# Patient Record
Sex: FEMALE | Race: WHITE | NOT HISPANIC OR LATINO | Employment: OTHER | URBAN - METROPOLITAN AREA
[De-identification: names, ages, dates, MRNs, and addresses within clinical notes are randomized per-mention and may not be internally consistent; named-entity substitution may affect disease eponyms.]

---

## 2019-01-02 ENCOUNTER — OFFICE VISIT (OUTPATIENT)
Dept: GASTROENTEROLOGY | Facility: CLINIC | Age: 64
End: 2019-01-02

## 2019-01-02 VITALS
TEMPERATURE: 98.4 F | BODY MASS INDEX: 30.4 KG/M2 | HEIGHT: 62 IN | WEIGHT: 165.2 LBS | DIASTOLIC BLOOD PRESSURE: 76 MMHG | SYSTOLIC BLOOD PRESSURE: 130 MMHG

## 2019-01-02 DIAGNOSIS — R19.8 ABNORMAL BOWEL HABITS: ICD-10-CM

## 2019-01-02 DIAGNOSIS — R15.1 FECAL SMEARING: Primary | ICD-10-CM

## 2019-01-02 PROCEDURE — 99203 OFFICE O/P NEW LOW 30 MIN: CPT | Performed by: INTERNAL MEDICINE

## 2019-01-02 RX ORDER — ESTRADIOL 2 MG/1
2 TABLET ORAL DAILY
COMMUNITY

## 2019-01-02 RX ORDER — LORAZEPAM 1 MG/1
TABLET ORAL
COMMUNITY
End: 2022-11-18

## 2019-01-02 RX ORDER — VALACYCLOVIR HYDROCHLORIDE 1 G/1
1000 TABLET, FILM COATED ORAL DAILY PRN
COMMUNITY

## 2019-01-02 RX ORDER — OMEPRAZOLE 20 MG/1
CAPSULE, DELAYED RELEASE ORAL
COMMUNITY
End: 2020-07-10 | Stop reason: SDUPTHER

## 2019-01-02 RX ORDER — ACYCLOVIR 50 MG/G
1 CREAM TOPICAL AS NEEDED
COMMUNITY

## 2019-01-02 RX ORDER — RIZATRIPTAN BENZOATE 10 MG/1
TABLET ORAL
COMMUNITY
End: 2022-11-18

## 2019-01-02 RX ORDER — OXYCODONE HCL 40 MG/1
40 TABLET, FILM COATED, EXTENDED RELEASE ORAL EVERY 12 HOURS
COMMUNITY

## 2019-01-02 RX ORDER — NYSTATIN 100000 U/G
CREAM TOPICAL
COMMUNITY
End: 2022-11-18

## 2019-01-02 RX ORDER — ONDANSETRON 8 MG/1
8 TABLET, ORALLY DISINTEGRATING ORAL EVERY 8 HOURS PRN
Refills: 1 | COMMUNITY
Start: 2018-12-22

## 2019-01-02 RX ORDER — ESOMEPRAZOLE MAGNESIUM 40 MG/1
CAPSULE, DELAYED RELEASE ORAL
COMMUNITY
End: 2020-07-10

## 2019-01-02 RX ORDER — MONTELUKAST SODIUM 10 MG/1
10 TABLET ORAL NIGHTLY
COMMUNITY

## 2019-01-02 RX ORDER — LIDOCAINE AND PRILOCAINE 25; 25 MG/G; MG/G
CREAM TOPICAL
Refills: 5 | COMMUNITY
Start: 2018-12-15 | End: 2023-02-20

## 2019-01-02 RX ORDER — TIZANIDINE 4 MG/1
TABLET ORAL
Refills: 0 | COMMUNITY
Start: 2018-12-10 | End: 2022-11-18

## 2019-01-02 RX ORDER — LEVOTHYROXINE SODIUM 88 UG/1
88 TABLET ORAL DAILY
COMMUNITY

## 2019-01-02 RX ORDER — OXYCODONE HYDROCHLORIDE 15 MG/1
15 TABLET ORAL EVERY 4 HOURS PRN
COMMUNITY

## 2019-01-02 NOTE — PROGRESS NOTES
Chief Complaint   Patient presents with   • Fecal Incontinence   • Heartburn     Fanny Perera is a 63 y.o. female who presents with fecal incontinence and heartburn.    This has been going on for years but getting worse.  She has a BM daily.  She has trouble getting clean.  She cannot sense a BM - she has incontinence w/o warning.  No blood in stool.  She will have days with multiple BMs - can't get clean. She has tried fiber without improvement     Heartburn is well controlled with current meds.  EGD  with mild inflammation - no Ramos's.      She has had 13 back surgeries and is on chronic pain medication.  She has not had any change in her dose.  She's been on pain medication for 13 years.    Her last colonoscopy was many years ago.  She cannot recall that there being any abnormalities.  She has no family history of colorectal cancer or polyps.    HPI  Past Medical History:   Diagnosis Date   • Cancer (CMS/HCC)    • Chronic back pain    • GERD (gastroesophageal reflux disease)    • Herpes simplex    • Migraine    • Thyroid disease      Past Surgical History:   Procedure Laterality Date   • BREAST AUGMENTATION     • BREAST LUMPECTOMY     •  SECTION     • CHOLECYSTECTOMY     • COLONOSCOPY  approx     normal per patient   • HYSTERECTOMY     • LUMBAR SPINE SURGERY      x 13   • TONSILLECTOMY     • WISDOM TOOTH EXTRACTION         Current Outpatient Medications:   •  acyclovir (ZOVIRAX) 5 % cream, Zovirax 5 % topical cream, Disp: , Rfl:   •  diclofenac (VOLTAREN) 1 % gel gel, Voltaren 1 % topical gel, Disp: , Rfl:   •  estradiol (ESTRACE) 2 MG tablet, estradiol 2 mg tablet, Disp: , Rfl:   •  levothyroxine (SYNTHROID, LEVOTHROID) 88 MCG tablet, levothyroxine 88 mcg tablet, Disp: , Rfl:   •  lidocaine-prilocaine (EMLA) 2.5-2.5 % cream, APPLY 1-2 grams 3 TO 4 TIMES DAILY AS NEEDED, Disp: , Rfl: 5  •  LORazepam (ATIVAN) 1 MG tablet, lorazepam 1 mg tablet, Disp: , Rfl:   •  LYRICA 50 MG capsule, Take 50  mg by mouth 2 (Two) Times a Day., Disp: , Rfl: 0  •  montelukast (SINGULAIR) 10 MG tablet, montelukast 10 mg tablet, Disp: , Rfl:   •  nystatin (MYCOSTATIN) 835042 UNIT/GM cream, nystatin 100,000 unit/gram topical cream, Disp: , Rfl:   •  omeprazole (priLOSEC) 20 MG capsule, omeprazole 20 mg capsule,delayed release, Disp: , Rfl:   •  ondansetron ODT (ZOFRAN-ODT) 8 MG disintegrating tablet, TAKE 1 TABLET EVERY 8 HOURS AS NEEDED FOR NAUSEA AND VOMITING, Disp: , Rfl: 1  •  oxyCODONE (ROXICODONE) 15 MG immediate release tablet, oxycodone 15 mg tablet, Disp: , Rfl:   •  oxyCODONE ER (OXYCONTIN) 40 MG 12 hr tablet, OxyContin 40 mg tablet,crush resistant,extended release, Disp: , Rfl:   •  rizatriptan (MAXALT) 10 MG tablet, rizatriptan 10 mg tablet, Disp: , Rfl:   •  tiZANidine (ZANAFLEX) 4 MG tablet, Take 1/2 tablet by mouth in the morning and (1 & 1/2) tablets in the evening, as needed, Disp: , Rfl: 0  •  triamcinolone (KENALOG) 0.1 % ointment, APPLY TO EXTERNAL EAR CANAL TWICE DAILY AS NEEDED, Disp: , Rfl: 2  •  umeclidinium-vilanterol (ANORO ELLIPTA) 62.5-25 MCG/INH aerosol powder  inhaler, Anoro Ellipta 62.5 mcg-25 mcg/actuation powder for inhalation, Disp: , Rfl:   •  valACYclovir (VALTREX) 1000 MG tablet, valacyclovir 1 gram tablet, Disp: , Rfl:   •  esomeprazole (NEXIUM) 40 MG capsule, esomeprazole magnesium 40 mg capsule,delayed release, Disp: , Rfl:   Allergies no known allergies  Social History     Socioeconomic History   • Marital status:      Spouse name: Not on file   • Number of children: Not on file   • Years of education: Not on file   • Highest education level: Not on file   Social Needs   • Financial resource strain: Not on file   • Food insecurity - worry: Not on file   • Food insecurity - inability: Not on file   • Transportation needs - medical: Not on file   • Transportation needs - non-medical: Not on file   Occupational History   • Not on file   Tobacco Use   • Smoking status: Former Smoker      Packs/day: 0.50     Types: Cigarettes     Start date:      Last attempt to quit:      Years since quittin.0   • Smokeless tobacco: Never Used   Substance and Sexual Activity   • Alcohol use: Yes     Alcohol/week: 0.6 oz     Types: 1 Glasses of wine per week     Comment: kseldom   • Drug use: No   • Sexual activity: Not on file   Other Topics Concern   • Not on file   Social History Narrative   • Not on file     History reviewed. No pertinent family history.  Review of Systems   Constitutional: Negative for appetite change and unexpected weight change.   Gastrointestinal: Positive for abdominal pain and diarrhea. Negative for blood in stool.   Musculoskeletal: Positive for back pain.   All other systems reviewed and are negative.    Vitals:    19 1328   BP: 130/76   Temp: 98.4 °F (36.9 °C)         19  1328   Weight: 74.9 kg (165 lb 3.2 oz)     Physical Exam   Constitutional: She appears well-developed and well-nourished.   HENT:   Head: Normocephalic and atraumatic.   Eyes: No scleral icterus.   Pulmonary/Chest: Effort normal.   Abdominal: Soft. She exhibits no distension and no mass. There is no tenderness.   Neurological: She is alert.   Skin: Skin is warm and dry.   Psychiatric: She has a normal mood and affect.     No images are attached to the encounter.  No notes on file  Fanny was seen today for fecal incontinence and heartburn.    Diagnoses and all orders for this visit:    Fecal smearing  -     Case Request; Standing  -     Case Request    Abnormal bowel habits  -     Case Request; Standing  -     Case Request    Other orders  -     Follow Anesthesia Guidelines / Standing Orders; Future  -     Implement Anesthesia orders day of procedure.; Standing  -     Obtain informed consent; Standing  -     Verify bowel prep was successful; Standing  -     Give tap water enema if bowel prep was insufficient; Standing    PLan:  - c/s for further eval  - further recs based upon results of  colonoscopic and anorectal exam

## 2019-01-22 ENCOUNTER — ANESTHESIA EVENT (OUTPATIENT)
Dept: GASTROENTEROLOGY | Facility: HOSPITAL | Age: 64
End: 2019-01-22

## 2019-01-22 ENCOUNTER — HOSPITAL ENCOUNTER (OUTPATIENT)
Facility: HOSPITAL | Age: 64
Setting detail: HOSPITAL OUTPATIENT SURGERY
Discharge: HOME | End: 2019-01-22
Attending: INTERNAL MEDICINE | Admitting: INTERNAL MEDICINE

## 2019-01-22 ENCOUNTER — ANESTHESIA (OUTPATIENT)
Dept: GASTROENTEROLOGY | Facility: HOSPITAL | Age: 64
End: 2019-01-22

## 2019-01-22 VITALS
BODY MASS INDEX: 29.44 KG/M2 | HEIGHT: 62 IN | DIASTOLIC BLOOD PRESSURE: 78 MMHG | TEMPERATURE: 97.9 F | RESPIRATION RATE: 16 BRPM | SYSTOLIC BLOOD PRESSURE: 129 MMHG | OXYGEN SATURATION: 100 % | HEART RATE: 61 BPM | WEIGHT: 160 LBS

## 2019-01-22 DIAGNOSIS — R15.1 FECAL SMEARING: ICD-10-CM

## 2019-01-22 DIAGNOSIS — R19.8 ABNORMAL BOWEL HABITS: ICD-10-CM

## 2019-01-22 PROCEDURE — S0260 H&P FOR SURGERY: HCPCS | Performed by: INTERNAL MEDICINE

## 2019-01-22 PROCEDURE — 88305 TISSUE EXAM BY PATHOLOGIST: CPT | Performed by: INTERNAL MEDICINE

## 2019-01-22 PROCEDURE — 45380 COLONOSCOPY AND BIOPSY: CPT | Performed by: INTERNAL MEDICINE

## 2019-01-22 PROCEDURE — 25010000002 PROPOFOL 10 MG/ML EMULSION: Performed by: ANESTHESIOLOGY

## 2019-01-22 RX ORDER — LIDOCAINE HYDROCHLORIDE 20 MG/ML
INJECTION, SOLUTION INFILTRATION; PERINEURAL AS NEEDED
Status: DISCONTINUED | OUTPATIENT
Start: 2019-01-22 | End: 2019-01-22 | Stop reason: SURG

## 2019-01-22 RX ORDER — SODIUM CHLORIDE, SODIUM LACTATE, POTASSIUM CHLORIDE, CALCIUM CHLORIDE 600; 310; 30; 20 MG/100ML; MG/100ML; MG/100ML; MG/100ML
1000 INJECTION, SOLUTION INTRAVENOUS CONTINUOUS
Status: DISCONTINUED | OUTPATIENT
Start: 2019-01-22 | End: 2019-01-22 | Stop reason: HOSPADM

## 2019-01-22 RX ORDER — PROPOFOL 10 MG/ML
VIAL (ML) INTRAVENOUS CONTINUOUS PRN
Status: DISCONTINUED | OUTPATIENT
Start: 2019-01-22 | End: 2019-01-22 | Stop reason: SURG

## 2019-01-22 RX ORDER — PROPOFOL 10 MG/ML
VIAL (ML) INTRAVENOUS AS NEEDED
Status: DISCONTINUED | OUTPATIENT
Start: 2019-01-22 | End: 2019-01-22 | Stop reason: SURG

## 2019-01-22 RX ADMIN — LIDOCAINE HYDROCHLORIDE 60 MG: 20 INJECTION, SOLUTION INFILTRATION; PERINEURAL at 08:32

## 2019-01-22 RX ADMIN — PROPOFOL 100 MCG/KG/MIN: 10 INJECTION, EMULSION INTRAVENOUS at 08:30

## 2019-01-22 RX ADMIN — PROPOFOL 100 MG: 10 INJECTION, EMULSION INTRAVENOUS at 08:30

## 2019-01-22 RX ADMIN — SODIUM CHLORIDE, POTASSIUM CHLORIDE, SODIUM LACTATE AND CALCIUM CHLORIDE: 600; 310; 30; 20 INJECTION, SOLUTION INTRAVENOUS at 08:30

## 2019-01-22 NOTE — ANESTHESIA PREPROCEDURE EVALUATION
Anesthesia Evaluation     Patient summary reviewed and Nursing notes reviewed                Airway   Mallampati: I  TM distance: >3 FB  Neck ROM: full  No difficulty expected  Dental - normal exam     Pulmonary - negative pulmonary ROS and normal exam   Cardiovascular - negative cardio ROS and normal exam        Neuro/Psych  (+) headaches,     GI/Hepatic/Renal/Endo - negative ROS     Musculoskeletal (-) negative ROS    Abdominal  - normal exam    Bowel sounds: normal.   Substance History - negative use     OB/GYN negative ob/gyn ROS         Other      history of cancer                    Anesthesia Plan    ASA 2     MAC     Anesthetic plan, all risks, benefits, and alternatives have been provided, discussed and informed consent has been obtained with: patient.

## 2019-01-22 NOTE — ANESTHESIA POSTPROCEDURE EVALUATION
"Patient: Fanny Perera    Procedure Summary     Date:  01/22/19 Room / Location:   [facility]    Anesthesia Start:  0831 Anesthesia Stop:  0901    Procedure:  COLONOSCOPY (N/A ) Diagnosis:       Fecal smearing      Abnormal bowel habits      (Fecal smearing [R15.1])      (Abnormal bowel habits [R19.8])    Surgeon:  Socorro Ramsay MD Provider:  Celso Lr MD    Anesthesia Type:  MAC ASA Status:  2          Anesthesia Type: MAC  Last vitals  BP   129/78 (01/22/19 0918)   Temp   36.6 °C (97.9 °F) (01/22/19 0908)   Pulse   61 (01/22/19 0918)   Resp   16 (01/22/19 0918)     SpO2   100 % (01/22/19 0918)     Post Anesthesia Care and Evaluation    Patient location during evaluation: PACU  Patient participation: complete - patient participated  Level of consciousness: awake  Pain score: 0  Pain management: adequate  Airway patency: patent  Anesthetic complications: No anesthetic complications  PONV Status: none  Cardiovascular status: acceptable  Respiratory status: acceptable  Hydration status: acceptable    Comments: /78 (BP Location: Left arm, Patient Position: Lying)   Pulse 61   Temp 36.6 °C (97.9 °F) (Oral)   Resp 16   Ht 157.5 cm (62\")   Wt 72.6 kg (160 lb)   SpO2 100%   BMI 29.26 kg/m²       "

## 2019-01-22 NOTE — H&P
[facility]  Pre Procedure History & Physical    Chief Complaint: fecal incontinence      Subjective     HPI:   Fanny Perera is a 63 y.o. female who presents with fecal incontinence and heartburn.     This has been going on for years but getting worse.  She has a BM daily.  She has trouble getting clean.  She cannot sense a BM - she has incontinence w/o warning.  No blood in stool.  She will have days with multiple BMs - can't get clean. She has tried fiber without improvement      Heartburn is well controlled with current meds.  EGD  with mild inflammation - no Ramos's.       She has had 13 back surgeries and is on chronic pain medication.  She has not had any change in her dose.  She's been on pain medication for 13 years.     Her last colonoscopy was many years ago.  She cannot recall that there being any abnormalities.  She has no family history of colorectal cancer or polyps        Past Medical History:   Past Medical History:   Diagnosis Date   • Cancer (CMS/HCC)    • Chronic back pain    • GERD (gastroesophageal reflux disease)    • Herpes simplex    • Migraine    • Thyroid disease        Past Surgical History:  Past Surgical History:   Procedure Laterality Date   • BACK SURGERY     • BREAST AUGMENTATION     • BREAST LUMPECTOMY     •  SECTION     • CHOLECYSTECTOMY     • COLONOSCOPY  approx     normal per patient   • HYSTERECTOMY     • LUMBAR SPINE SURGERY      x 13   • TONSILLECTOMY     • WISDOM TOOTH EXTRACTION         Family History:  History reviewed. No pertinent family history.    Social History:   reports that she quit smoking about 23 years ago. Her smoking use included cigarettes. She started smoking about 47 years ago. She smoked 0.50 packs per day. she has never used smokeless tobacco. She reports that she drinks about 0.6 oz of alcohol per week. She reports that she does not use drugs.    Medications:   Medications Prior to Admission   Medication Sig  Dispense Refill Last Dose   • acyclovir (ZOVIRAX) 5 % cream Zovirax 5 % topical cream   Past Week at Unknown time   • diclofenac (VOLTAREN) 1 % gel gel Voltaren 1 % topical gel   Past Week at Unknown time   • esomeprazole (NEXIUM) 40 MG capsule esomeprazole magnesium 40 mg capsule,delayed release   1/21/2019 at Unknown time   • estradiol (ESTRACE) 2 MG tablet estradiol 2 mg tablet   1/21/2019 at Unknown time   • levothyroxine (SYNTHROID, LEVOTHROID) 88 MCG tablet levothyroxine 88 mcg tablet   1/21/2019 at Unknown time   • lidocaine-prilocaine (EMLA) 2.5-2.5 % cream APPLY 1-2 grams 3 TO 4 TIMES DAILY AS NEEDED  5 Past Week at Unknown time   • LYRICA 50 MG capsule Take 50 mg by mouth 2 (Two) Times a Day.  0 Past Week at Unknown time   • montelukast (SINGULAIR) 10 MG tablet montelukast 10 mg tablet   Past Month at Unknown time   • omeprazole (priLOSEC) 20 MG capsule omeprazole 20 mg capsule,delayed release   1/21/2019 at Unknown time   • ondansetron ODT (ZOFRAN-ODT) 8 MG disintegrating tablet TAKE 1 TABLET EVERY 8 HOURS AS NEEDED FOR NAUSEA AND VOMITING  1 Past Week at Unknown time   • oxyCODONE (ROXICODONE) 15 MG immediate release tablet oxycodone 15 mg tablet   1/22/2019 at Unknown time   • oxyCODONE ER (OXYCONTIN) 40 MG 12 hr tablet OxyContin 40 mg tablet,crush resistant,extended release   1/21/2019 at Unknown time   • rizatriptan (MAXALT) 10 MG tablet rizatriptan 10 mg tablet   Past Week at Unknown time   • tiZANidine (ZANAFLEX) 4 MG tablet Take 1/2 tablet by mouth in the morning and (1 & 1/2) tablets in the evening, as needed  0 Past Month at Unknown time   • triamcinolone (KENALOG) 0.1 % ointment APPLY TO EXTERNAL EAR CANAL TWICE DAILY AS NEEDED  2 Past Month at Unknown time   • LORazepam (ATIVAN) 1 MG tablet lorazepam 1 mg tablet   Taking   • nystatin (MYCOSTATIN) 566258 UNIT/GM cream nystatin 100,000 unit/gram topical cream   More than a month at Unknown time   • umeclidinium-vilanterol (ANORO ELLIPTA) 62.5-25  "MCG/INH aerosol powder  inhaler Anoro Ellipta 62.5 mcg-25 mcg/actuation powder for inhalation   Taking   • valACYclovir (VALTREX) 1000 MG tablet valacyclovir 1 gram tablet   More than a month at Unknown time       Allergies:  Patient has no known allergies.    ROS:    Pertinent items are noted in HPI, all other systems reviewed and negative     Objective     Blood pressure 110/65, pulse 65, temperature 97.7 °F (36.5 °C), temperature source Oral, resp. rate 16, height 157.5 cm (62\"), weight 72.6 kg (160 lb), SpO2 96 %.    Physical Exam   Constitutional: Pt is oriented to person, place, and time and well-developed, well-nourished, and in no distress.   Mouth/Throat: Oropharynx is clear and moist.   Neck: Normal range of motion.   Cardiovascular: Normal rate, regular rhythm     Pulmonary/Chest: Effort normal   Abdominal: Soft. Nontender  Skin: Skin is warm and dry.   Psychiatric: Mood, memory, affect and judgment normal.     Assessment/Plan     Diagnosis:  Fecal incontinence, change in bowel habits    Anticipated Surgical Procedure:  colonoscopy    The risks, benefits, and alternatives of this procedure have been discussed with the patient or the responsible party- the patient understands and agrees to proceed.                                                            "

## 2019-01-23 ENCOUNTER — TELEPHONE (OUTPATIENT)
Dept: GASTROENTEROLOGY | Facility: CLINIC | Age: 64
End: 2019-01-23

## 2019-01-23 LAB
CYTO UR: NORMAL
LAB AP CASE REPORT: NORMAL
PATH REPORT.FINAL DX SPEC: NORMAL
PATH REPORT.GROSS SPEC: NORMAL

## 2019-01-23 RX ORDER — MONTELUKAST SODIUM 4 MG/1
TABLET, CHEWABLE ORAL
Qty: 60 TABLET | Refills: 11 | Status: SHIPPED | OUTPATIENT
Start: 2019-01-23 | End: 2019-02-19 | Stop reason: SDUPTHER

## 2019-01-23 NOTE — TELEPHONE ENCOUNTER
Please let her know that her biopsies did not show any inflammation    She does have decreased sphincter tone which is likely contributing to her issues.    Would like to try a trial of Colestid to see if this will help her have a more succinct bowel movement that is easier to control.  I have sent this to her pharmacy.  Take 1 tablet daily.  She needs to take it at a different time from her thyroid medicine.  Please schedule 6-8 week follow-up but call if not improving

## 2019-01-23 NOTE — TELEPHONE ENCOUNTER
Pt called and advised per Dr Ramsay that her bx did not show any inflammation.  She does have decrease sphincter tone which is likely contributing to her issues.      She would like her to trial colestid to see if this will help her have a more succinct bowel movement that is easier to control.  She has sent this to her pharmacy.  She is to take one tab daily.  She needs to take it a different time from her thyroid med.  Adivsed her to f/u in 6-8wks.  Pt verb understanding and will call back to make apt.

## 2019-02-11 ENCOUNTER — TELEPHONE (OUTPATIENT)
Dept: GASTROENTEROLOGY | Facility: CLINIC | Age: 64
End: 2019-02-11

## 2019-02-11 NOTE — TELEPHONE ENCOUNTER
----- Message from Yanique Delgadillo sent at 2/11/2019 10:54 AM EST -----  Regarding: pt called   Contact: [phone]  Pt called, stated medication colestipol (COLESTID) 1 g tablet is not working for her

## 2019-02-14 NOTE — TELEPHONE ENCOUNTER
Would conside trial movantik to see if this will help with bowel changes related to her pain meds - may allow her to have more natural BMs.  Would start alone w/o colestid.  If she would like to try will send rx

## 2019-02-14 NOTE — TELEPHONE ENCOUNTER
Call to pt.  Advise per Dr Ramsay that would consider trial movantik to see if this will  Help with bowel changes related to pain meds.  May allow to have more natural BM's.  Would start alone without colestid.      Pt verb understanding and states would like to try.  Message to Dr Ramsay.

## 2019-02-18 ENCOUNTER — TELEPHONE (OUTPATIENT)
Dept: GASTROENTEROLOGY | Facility: CLINIC | Age: 64
End: 2019-02-18

## 2019-02-18 NOTE — TELEPHONE ENCOUNTER
Called pt and pt reports that she started the movantik on Friday 02/15 .  Pt states it runs her to the bathroom She states she is having 3 very loose stools with urgency per day.  Pt states she read where this is a laxative. Pt states she has not been constipated but has been having issues with leakage.  Advised Dr Ramsay is out of the office today but will send message to Dr Ramsay.  Pt verb understanding.

## 2019-02-18 NOTE — TELEPHONE ENCOUNTER
----- Message from Sherron Molina sent at 2/18/2019  1:46 PM EST -----  Regarding: Naloxegol Oxalate  Contact: [phone]  PT STATED MADE HER PROBLEM WORSE..

## 2019-02-19 ENCOUNTER — TELEPHONE (OUTPATIENT)
Dept: GASTROENTEROLOGY | Facility: CLINIC | Age: 64
End: 2019-02-19

## 2019-02-19 RX ORDER — MONTELUKAST SODIUM 4 MG/1
2 TABLET, CHEWABLE ORAL DAILY
Qty: 60 TABLET | Refills: 11 | Status: SHIPPED | OUTPATIENT
Start: 2019-02-19 | End: 2020-03-04

## 2019-02-19 NOTE — TELEPHONE ENCOUNTER
Spoke with patient.  Advised her to stop the Movantik.  We discussed her response to Colestid.  She just felt like it did not help and she thinks she only took one a day.  I advised her to resume the Colestid at 2 g daily.  I reminded her not to take it near her Synthroid.  She will call me in 2 weeks with an update.  I refilled her prescription.

## 2019-02-19 NOTE — TELEPHONE ENCOUNTER
----- Message from Gail Hutson RN sent at 2/19/2019  4:15 PM EST -----  Regarding: Pt called again- still having issues   Contact: [phone]  Pt called back. She is still having issues with loose stools and urgency. Please call to discuss.

## 2019-02-19 NOTE — TELEPHONE ENCOUNTER
Call to pt.  Very frustrated.  States continues with stool leaking - worse since on Movantik (see note of 2/11).      Advise pt will send f/u message to DR Ramsay from message of 2/18.  Pt verb understanding.

## 2019-03-06 ENCOUNTER — TELEPHONE (OUTPATIENT)
Dept: GASTROENTEROLOGY | Facility: CLINIC | Age: 64
End: 2019-03-06

## 2019-03-06 NOTE — TELEPHONE ENCOUNTER
Call to pt.  States has resume colestid as instructed with call of 2/19.  No improvement.  Unless goes to BR immediately after eating, experiences stool incontinence.  Has no sensation that will be passing stool.  Denies blood, abd cramping.  At wits end on how to manage this.    Update to DR Ramsay.

## 2019-03-06 NOTE — TELEPHONE ENCOUNTER
----- Message from Amanda Loredo sent at 3/6/2019 11:24 AM EST -----  Regarding: MED NOT WORKING   Contact: [phone]  BOWEL INCONTINENCE. PLEASE CALL. PT DIDN'T HAVE MED WITH HER. PT SAID IT'S THE 1ST MED DR JONES STARTED HER ON.

## 2019-03-13 NOTE — TELEPHONE ENCOUNTER
Call to pt.  States stool consistency same as was before started colestid.  Describes as formed, but passes stool without prior urge.    Update to Dr Ramsay.

## 2019-03-20 NOTE — TELEPHONE ENCOUNTER
Given her lack of response to the agents we have tried so far, I would consider referral to a colorectal surgeon for consideration of InterStim placement (which is a treatment for fecal incontinence).  She will need anorectal manometry to test the strength of those muscles which can be performed by the colorectal surgeon to see if she is a candidate for this type of treatment.  Unfortunately she does not seem to be responding to medical management.  Please let me know if she is interested and I can place a consult.

## 2019-03-25 NOTE — TELEPHONE ENCOUNTER
Call to pt.  Advise per Dr Ramsay that given lack of response to the agents we have tried so far, would consider referral to a colorectal surgeon for consideration of InterStim placement (which is a tx for fecal incontinence).  Will need anorectal manometry to test the strength of those muscles which can be performed by the colorectal surgeon to see if is a candidate for this type of tx.  Unfortunately, does not seen to be responding to medical management.    Pt verb understanding.  Frustrated that has not heard back until today.  States understood from prior conversations with DR Ramsay that there would be a number of tx to try.  Asking if can try something else before surgical consult.    Question to DR Ramsay.

## 2019-03-25 NOTE — TELEPHONE ENCOUNTER
Call to pt.  Advise per Dr Ramsay that concern is that having formed stool, so don't think other meds are likely to be helpful.  At this point, needs to have sphinctger muscle tone checked with Dr Diaz to see if other options would be more helpful.    Verb understanding.  States will think about this and call back.

## 2019-03-25 NOTE — TELEPHONE ENCOUNTER
My concern is that she is having formed stool so I don't think other medications are likely to be helpful.  I think at this point she needs to have her sphincter muscle tone checked (via manometry) with dr devine to see if other options would be more helpful.

## 2019-07-23 RX ORDER — NALOXEGOL OXALATE 25 MG/1
TABLET, FILM COATED ORAL
Qty: 30 TABLET | Refills: 3 | OUTPATIENT
Start: 2019-07-23

## 2019-07-30 RX ORDER — NALOXEGOL OXALATE 25 MG/1
TABLET, FILM COATED ORAL
Qty: 30 TABLET | Refills: 3 | Status: SHIPPED | OUTPATIENT
Start: 2019-07-30 | End: 2019-11-20 | Stop reason: SDUPTHER

## 2019-07-30 NOTE — TELEPHONE ENCOUNTER
Called pt and pt reports she does need a refill on her movantik . Pt states it is really working well for her.   Advised pt will send message to Dr Ramsay.   Pt verb understanding and reports she is almost out of med.

## 2019-11-01 ENCOUNTER — TELEPHONE (OUTPATIENT)
Dept: GASTROENTEROLOGY | Facility: CLINIC | Age: 64
End: 2019-11-01

## 2019-11-01 NOTE — TELEPHONE ENCOUNTER
----- Message from Trinity Whitney sent at 11/1/2019  1:01 PM EDT -----  Regarding: MOVANTIK  Contact: [phone]  Pt says the med is not working anymore for her.

## 2019-11-01 NOTE — TELEPHONE ENCOUNTER
"Call to pt.  States movantik had been working very well until last few days.  Passing \"just a little bit of stool\".  Denies blood, discomfort, straining.  Worried that because not passing as much stool, then movantik not working properly any more and doesn't want to get back into trouble like had before.      No change in eating pattern.    Wondering if should up dose on movantik.    Update/concerns to Dr Ramsay.   "

## 2019-11-05 NOTE — TELEPHONE ENCOUNTER
She is on highest dose of movantik - rec increasing water intake, can use prunes or miralax daily until BM improves

## 2019-11-05 NOTE — TELEPHONE ENCOUNTER
"Called pt and advised per Dr Ramsay that she is on the highest dose of movantik.  She recommends increasing water intake , can use prunes or miralaz daily until bm's improve.     Pt states that she is not having problems with having bm's.  She states that is not what she told the other person.  Pt very short and agrumentative.  Pt states that she is \"very frustrated and states did Dr Ramsay not know what her problem is\".  Reviewed pt's calls with her and pt still stating that we are not understanding her.  Offered pt an appt since she was last seen 01/22/2019 and pt states she will call back later.   "

## 2019-11-20 ENCOUNTER — TELEPHONE (OUTPATIENT)
Dept: GASTROENTEROLOGY | Facility: CLINIC | Age: 64
End: 2019-11-20

## 2019-11-20 NOTE — TELEPHONE ENCOUNTER
Received vm from [place] pt' s pharmacist from [facility] advising that the pt is requesting refill on movantik. Message sent to Dr Ramsay.

## 2020-02-07 ENCOUNTER — TELEPHONE (OUTPATIENT)
Dept: GASTROENTEROLOGY | Facility: CLINIC | Age: 65
End: 2020-02-07

## 2020-02-07 NOTE — TELEPHONE ENCOUNTER
"Pt last seen 1/22/19.     Pt states \"I'm still having all the same problems - the medicines haven't helped at all\".  Reports has soft, formed stool - can return to BR in 15 min and have additional leakage.  \"I'm just at my wit's end\".      Advise pt due for annual appt.  States needs to be scheduled wk of 3/23.  Appt 3/24 @ 2:15 pm.      Further discussion re: symptoms. Advise pt per note of 3/6/19 that at that time DR Ramsay's concern was that having formed stool, so don't think other meds are likely to be helpful.  Recommend referral to DR Yesica Diaz to have sphincter muscle tone checked and to see if other options would be more helpful.    States \"well Dr Ramsay told me my sphincter tone was fine\".  Upon further discussion - states would like referral to Dr Diaz. Asking if any recommendations to manage symptoms in the meantime.  Message to DR Ramsay.   "

## 2020-02-07 NOTE — TELEPHONE ENCOUNTER
----- Message from Trinity Whitney sent at 2/7/2020  1:06 PM EST -----  Regarding: update  Contact: [phone]  Pt says she is not better even after taking the medication,

## 2020-02-10 NOTE — TELEPHONE ENCOUNTER
Decreased sphincter tone was noted on her colonoscopy in 2019 (see report).  Continues to have issues with incontinence and is having formed stool, would recommend evaluation by a colorectal physician to see if there is anything that can be done to help her sphincter tone and thereby increase her continence

## 2020-02-11 NOTE — TELEPHONE ENCOUNTER
Called pt and advised per Dr Ramsay that decrease sphincter tone was noted on her c/s in 2019.  With continued issues with incontinence and having formed stool she would recommend eval by colorectal physician to see if there is anything that can be done to help her sphincter tone and thereby increase her continence.     Pt verb understanding but wants to wait on the referral for now. States she will call back to let us know when she is ready. Update sent to Dr Ramsay.

## 2020-02-27 RX ORDER — MONTELUKAST SODIUM 4 MG/1
TABLET, CHEWABLE ORAL
Qty: 480 TABLET | Refills: 0 | OUTPATIENT
Start: 2020-02-27

## 2020-03-04 RX ORDER — MONTELUKAST SODIUM 4 MG/1
TABLET, CHEWABLE ORAL
Qty: 60 TABLET | Refills: 0 | Status: SHIPPED | OUTPATIENT
Start: 2020-03-04 | End: 2020-03-24 | Stop reason: SDUPTHER

## 2020-03-24 ENCOUNTER — TELEPHONE (OUTPATIENT)
Dept: GASTROENTEROLOGY | Facility: CLINIC | Age: 65
End: 2020-03-24

## 2020-03-24 RX ORDER — MONTELUKAST SODIUM 4 MG/1
2 TABLET, CHEWABLE ORAL DAILY
Qty: 60 TABLET | Refills: 2 | Status: SHIPPED | OUTPATIENT
Start: 2020-03-24 | End: 2020-06-29 | Stop reason: SDUPTHER

## 2020-03-24 NOTE — TELEPHONE ENCOUNTER
"Lynn Ruiz MA  [facility] Pool 2 hours ago (10:56 AM)      Patient has an appt to be seen 3/26/2020 after her original appt was cxld ... Needs to be seen by Dr Ami Diaz per note-colestipol refill needed     **per prior notes, pt on movantik.   Call to pt.  Requests cancel 3/26 appt.   has been taking colestid 1 gm - 2 tabs/po daily along with movantik 1 tab po daily with \"great results\".   has daily formed stool and no leakage.    Requesting refill of colestid.  Message to DR Ramsay.  Larisa Swift RN.   "

## 2020-03-24 NOTE — TELEPHONE ENCOUNTER
90-day refill sent-please schedule follow-up in 2 to 3 months.  No further refills.  Last seen 1/19

## 2020-03-25 NOTE — TELEPHONE ENCOUNTER
Call to pt.  Advise per Dr Ramsay note.  Verb understanding.  Appt scheduled with CORTNEY Nuñez for 6/5 @ 1:15 pm.

## 2020-06-23 RX ORDER — NALOXEGOL OXALATE 25 MG/1
TABLET, FILM COATED ORAL
Qty: 30 TABLET | Refills: 2 | OUTPATIENT
Start: 2020-06-23

## 2020-06-23 RX ORDER — MONTELUKAST SODIUM 4 MG/1
2 TABLET, CHEWABLE ORAL DAILY
Qty: 60 TABLET | Refills: 2 | OUTPATIENT
Start: 2020-06-23

## 2020-06-29 ENCOUNTER — TELEPHONE (OUTPATIENT)
Dept: GASTROENTEROLOGY | Facility: CLINIC | Age: 65
End: 2020-06-29

## 2020-06-29 RX ORDER — MONTELUKAST SODIUM 4 MG/1
2 TABLET, CHEWABLE ORAL DAILY
Qty: 60 TABLET | Refills: 2 | Status: SHIPPED | OUTPATIENT
Start: 2020-06-29 | End: 2020-07-10 | Stop reason: SDUPTHER

## 2020-06-29 NOTE — TELEPHONE ENCOUNTER
----- Message from Trinity Jessica sent at 6/29/2020  9:47 AM EDT -----  Regarding: Refill Request  Contact: [phone]  Patient made an appointment on July 10th with Apurva for her refill, but would like to know if she can get a refill for her medications to hold her until her appointment.     Naloxegol Oxalate   Colestipol       Pharmacy [address] [place]  Phone: [phone]  Fax: [phone]

## 2020-06-29 NOTE — TELEPHONE ENCOUNTER
I sent her in some refills for 3 months.  That should give her some time to get the follow-up appointment.  Thanks

## 2020-07-10 ENCOUNTER — OFFICE VISIT (OUTPATIENT)
Dept: GASTROENTEROLOGY | Facility: CLINIC | Age: 65
End: 2020-07-10

## 2020-07-10 VITALS — HEIGHT: 62 IN | BODY MASS INDEX: 29.44 KG/M2 | WEIGHT: 160 LBS

## 2020-07-10 DIAGNOSIS — R15.1 FECAL SMEARING: ICD-10-CM

## 2020-07-10 DIAGNOSIS — K58.2 IRRITABLE BOWEL SYNDROME WITH BOTH CONSTIPATION AND DIARRHEA: ICD-10-CM

## 2020-07-10 DIAGNOSIS — K21.9 GASTROESOPHAGEAL REFLUX DISEASE, ESOPHAGITIS PRESENCE NOT SPECIFIED: Primary | ICD-10-CM

## 2020-07-10 PROCEDURE — 99442 PR PHYS/QHP TELEPHONE EVALUATION 11-20 MIN: CPT | Performed by: NURSE PRACTITIONER

## 2020-07-10 RX ORDER — MONTELUKAST SODIUM 4 MG/1
2 TABLET, CHEWABLE ORAL DAILY
Qty: 60 TABLET | Refills: 11 | Status: SHIPPED | OUTPATIENT
Start: 2020-07-10 | End: 2021-08-30 | Stop reason: SDUPTHER

## 2020-07-10 RX ORDER — OMEPRAZOLE 20 MG/1
20 CAPSULE, DELAYED RELEASE ORAL DAILY
Qty: 90 CAPSULE | Refills: 3 | Status: SHIPPED | OUTPATIENT
Start: 2020-07-10 | End: 2021-09-24

## 2020-07-10 RX ORDER — GABAPENTIN 600 MG/1
600 TABLET ORAL DAILY
COMMUNITY
Start: 2020-07-07 | End: 2023-01-13

## 2020-07-10 NOTE — PROGRESS NOTES
Chief Complaint   Patient presents with   • Follow-up   • Constipation   • Diarrhea       Fanny Perera is a  65 y.o. female here for a telephone follow up visit for diarrhea.    HPI  65-year-old female presents today for telephone follow-up visit for diarrhea.  She is a patient Dr. Ramsay.  She was last seen for colonoscopy on 2019.You have chosen to receive care through a telephone visit. Do you consent to use a telephone visit for your medical care today? YES.      She has a history of IBS-mixed and admits lately she has been doing great with taking Movantik and colestipol.  She does me her bowels are moving regularly every day.  She is so happy about this.  She also has a history of GERD and admits she is doing really well on omeprazole 20 mg once daily.  She denies any breakthrough reflux at this time.  She denies any dysphagia, reflux, abdominal pain, nausea and vomiting, diarrhea, constipation, rectal bleeding or melena.  She was appetite is good and her weight is stable. Last colonoscopy was done on 2019.  She also has history of cholecystectomy.        Past Medical History:   Diagnosis Date   • Cancer (CMS/HCC)    • Chronic back pain    • GERD (gastroesophageal reflux disease)    • Herpes simplex    • Migraine    • Thyroid disease        Past Surgical History:   Procedure Laterality Date   • BACK SURGERY     • BREAST AUGMENTATION     • BREAST LUMPECTOMY     •  SECTION     • CHOLECYSTECTOMY     • COLONOSCOPY  approx     normal per patient   • COLONOSCOPY N/A 2019    Procedure: COLONOSCOPY to cecum and TI;  Surgeon: Socorro Ramsay MD;  Location: [facility] ENDOSCOPY;  Service: Gastroenterology   • HYSTERECTOMY     • LUMBAR SPINE SURGERY      x 13   • TONSILLECTOMY     • WISDOM TOOTH EXTRACTION         Scheduled Meds:    Continuous Infusions:  No current facility-administered medications for this visit.     PRN Meds:.    No Known Allergies    Social History     Socioeconomic  History   • Marital status:      Spouse name: Not on file   • Number of children: Not on file   • Years of education: Not on file   • Highest education level: Not on file   Tobacco Use   • Smoking status: Former Smoker     Packs/day: 0.50     Types: Cigarettes     Start date:      Last attempt to quit:      Years since quittin.5   • Smokeless tobacco: Never Used   Substance and Sexual Activity   • Alcohol use: Yes     Alcohol/week: 1.0 standard drinks     Types: 1 Glasses of wine per week     Comment: kseldom   • Drug use: No   • Sexual activity: Defer       History reviewed. No pertinent family history.    Review of Systems   Constitutional: Negative for appetite change, chills, diaphoresis, fatigue, fever and unexpected weight change.   HENT: Negative for nosebleeds, postnasal drip, sore throat, trouble swallowing and voice change.    Respiratory: Negative for cough, choking, chest tightness, shortness of breath and wheezing.    Cardiovascular: Negative for chest pain, palpitations and leg swelling.   Gastrointestinal: Negative for abdominal distention, abdominal pain, anal bleeding, blood in stool, constipation, diarrhea, nausea, rectal pain and vomiting.   Endocrine: Negative for polydipsia, polyphagia and polyuria.   Musculoskeletal: Negative for gait problem.   Skin: Negative for rash and wound.   Allergic/Immunologic: Negative for food allergies.   Neurological: Negative for dizziness, speech difficulty and light-headedness.   Psychiatric/Behavioral: Negative for confusion, self-injury, sleep disturbance and suicidal ideas.       There were no vitals filed for this visit.    Physical Exam   Constitutional: She is oriented to person, place, and time. No distress.   Neurological: She is alert and oriented to person, place, and time.   Psychiatric: She has a normal mood and affect. Her behavior is normal. Judgment and thought content normal.       No radiology results for the last 7 days      Assessment and plan    1. Gastroesophageal reflux disease, esophagitis presence not specified    2. Irritable bowel syndrome with both constipation and diarrhea    3. Fecal smearing    Today's visit was done over the telephone.  Total time on the phone with the patient was 15 minutes.  Overall she is doing great.  Bowels are moving well on Movantik and colestipol.  She is happy report she has a bowel movement every day.  GERD seems well controlled on omeprazole 20 mg once daily.  She denies any breakthrough reflux at this time.  Continue GERD precautions.  Patient to call the office with any issues.  Patient to follow-up with me or Dr. Ramsay in 6 months.

## 2020-07-13 ENCOUNTER — TELEPHONE (OUTPATIENT)
Dept: GASTROENTEROLOGY | Facility: CLINIC | Age: 65
End: 2020-07-13

## 2020-07-13 NOTE — TELEPHONE ENCOUNTER
----- Message from JONNIE Arias sent at 7/10/2020  3:29 PM EDT -----  Please call the patient and schedule her a 6-month office follow-up with me or Dr. Ramsay.  Thanks

## 2020-10-05 ENCOUNTER — TELEPHONE (OUTPATIENT)
Dept: GASTROENTEROLOGY | Facility: CLINIC | Age: 65
End: 2020-10-05

## 2020-10-05 NOTE — TELEPHONE ENCOUNTER
----- Message from Trinity Whitney sent at 10/5/2020 12:09 PM EDT -----  Regarding: Naloxegol Oxalate (Movantik) 25 MG tablet  Contact: [phone]  Pt says her medication got thrown away and wants to know if there is anything else she can take for this month.

## 2020-10-06 NOTE — TELEPHONE ENCOUNTER
Do we have any samples of Movantik here in the office that we can give her?  I do not mind giving her some samples to hold her over for the month until she can get her prescription refilled.

## 2020-10-08 RX ORDER — NALOXEGOL OXALATE 25 MG/1
25 TABLET, FILM COATED ORAL EVERY MORNING
Qty: 30 TABLET | Refills: 5 | Status: SHIPPED | OUTPATIENT
Start: 2020-10-08 | End: 2021-05-06 | Stop reason: SDUPTHER

## 2020-10-08 NOTE — TELEPHONE ENCOUNTER
I just resent the prescription so she can go ahead and pick it up.  Her pharmacy may charge her more though if it is early.

## 2021-05-06 ENCOUNTER — TELEPHONE (OUTPATIENT)
Dept: GASTROENTEROLOGY | Facility: CLINIC | Age: 66
End: 2021-05-06

## 2021-05-06 RX ORDER — NALOXEGOL OXALATE 25 MG/1
25 TABLET, FILM COATED ORAL EVERY MORNING
Qty: 30 TABLET | Refills: 2 | Status: SHIPPED | OUTPATIENT
Start: 2021-05-06 | End: 2021-07-30

## 2021-05-06 NOTE — TELEPHONE ENCOUNTER
----- Message from Trinity Espinoza sent at 5/5/2021  4:17 PM EDT -----  Regarding: med refill  Contact: [phone]  Pt needs refill but is going to a new pharmacy listed below      Naloxegol Oxalate (Movantik) 25 MG tablet       [facility]  [phone]

## 2021-05-06 NOTE — TELEPHONE ENCOUNTER
Pharmacy info updated.      Escribe initiated for movantik 25 mg 1 tab po daily, #30, R2 with note to pharmacy that no future refills until seen in office (due for annual appt in July).   Message to CORTNEY Nuñez.

## 2021-07-30 RX ORDER — NALOXEGOL OXALATE 25 MG/1
25 TABLET, FILM COATED ORAL EVERY MORNING
Qty: 30 TABLET | Refills: 2 | Status: SHIPPED | OUTPATIENT
Start: 2021-07-30 | End: 2021-11-10 | Stop reason: SDUPTHER

## 2021-07-30 NOTE — TELEPHONE ENCOUNTER
----- Message from Trinity Espinoza sent at 7/30/2021  1:45 PM EDT -----  Regarding: Med refill  Contact: [phone]  Pt is needing refill, NEW pharmacy listed is completely out and is going out of town in the morning        Naloxegol Oxalate (Movantik) 25 MG tablet       [facility]  [phone]

## 2021-08-30 ENCOUNTER — TELEPHONE (OUTPATIENT)
Dept: GASTROENTEROLOGY | Facility: CLINIC | Age: 66
End: 2021-08-30

## 2021-08-30 RX ORDER — MONTELUKAST SODIUM 4 MG/1
2 TABLET, CHEWABLE ORAL DAILY
Qty: 60 TABLET | Refills: 0 | Status: SHIPPED | OUTPATIENT
Start: 2021-08-30 | End: 2021-09-24

## 2021-08-30 NOTE — TELEPHONE ENCOUNTER
----- Message from Trinity Espinoza Rep sent at 8/30/2021  3:32 PM EDT -----  Regarding: med refill  Contact: [phone]  Pt needs refill, does have NEW pharmacy listed        colestipol (COLESTID) 1 g tablet         [facility]  [phone]

## 2021-08-30 NOTE — TELEPHONE ENCOUNTER
"Pharmacy info updated.     See o/v note of 7/10/20: \" Bowels are moving well on Movantik and colestipol\".    Pt has upcoming appt with CORTNEY Nuñez on 9/21 @ 3pm.     Escribe completed for colestid 1 gm - take 2 tabs by mouth daily, #60, R0.     VM to pt with request to contact office.   "

## 2021-09-24 ENCOUNTER — OFFICE VISIT (OUTPATIENT)
Dept: GASTROENTEROLOGY | Facility: CLINIC | Age: 66
End: 2021-09-24

## 2021-09-24 VITALS — TEMPERATURE: 97.3 F | BODY MASS INDEX: 31.1 KG/M2 | WEIGHT: 169 LBS | HEIGHT: 62 IN

## 2021-09-24 DIAGNOSIS — K58.0 IRRITABLE BOWEL SYNDROME WITH DIARRHEA: ICD-10-CM

## 2021-09-24 DIAGNOSIS — R15.9 BOWEL AND BLADDER INCONTINENCE: ICD-10-CM

## 2021-09-24 DIAGNOSIS — R15.9 INCONTINENCE OF FECES, UNSPECIFIED FECAL INCONTINENCE TYPE: Primary | ICD-10-CM

## 2021-09-24 DIAGNOSIS — R32 BOWEL AND BLADDER INCONTINENCE: ICD-10-CM

## 2021-09-24 DIAGNOSIS — K21.9 GASTROESOPHAGEAL REFLUX DISEASE, UNSPECIFIED WHETHER ESOPHAGITIS PRESENT: ICD-10-CM

## 2021-09-24 PROCEDURE — 99214 OFFICE O/P EST MOD 30 MIN: CPT | Performed by: PHYSICIAN ASSISTANT

## 2021-09-24 RX ORDER — OMEPRAZOLE 40 MG/1
40 CAPSULE, DELAYED RELEASE ORAL DAILY
Qty: 90 CAPSULE | Refills: 3 | Status: SHIPPED | OUTPATIENT
Start: 2021-09-24 | End: 2022-04-12 | Stop reason: SDUPTHER

## 2021-09-24 RX ORDER — CHOLESTYRAMINE 4 G/9G
4 POWDER, FOR SUSPENSION ORAL 2 TIMES DAILY
Qty: 378 G | Refills: 3 | Status: SHIPPED | OUTPATIENT
Start: 2021-09-24 | End: 2022-02-14 | Stop reason: DRUGHIGH

## 2021-09-24 NOTE — PROGRESS NOTES
"Chief Complaint  Follow-up    Subjective          History of Present Illness    Fanny Perera is a  66 y.o. female presents for follow-up on IBS GERD.  She is a patient of Dr. Ramsay.  She last saw Apurva on 7/10/2020.  She is new to me.    She has history of mixed IBS and takes Movantik and colestipol  2 tablets every morning for this. She does report more recent trouble swallowing the colestipol tablet. She denies trouble swallowing any other pills, liquids, or foods.     She reports that her stools are somewhat better on her current medication however she still has stool leakage which is very upsetting to her. She also admits to bladder leakage. She reports mostly soft stools at this point. She typically does not have hard constipated stools.    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 She has rods from her cervical spine all the way down to her lumbar. She has another spinal surgery on 10/4/2021 as one of the rods has broken.    She also has history of GERD taking omeprazole 20 mg daily. This controls her symptoms fairly well as long as she follows a GERD diet.    She denies melena, hematochezia, abdominal pain, nausea, vomiting.     Last colonoscopy was done on 1/22/2019.  She also has history of cholecystectomy.    Objective   Vital Signs:   Temp 97.3 °F (36.3 °C)   Ht 157.5 cm (62\")   Wt 76.7 kg (169 lb)   BMI 30.91 kg/m²       Physical Exam  Vitals reviewed.   Constitutional:       General: She is awake. She is not in acute distress.     Appearance: Normal appearance. She is well-developed and well-groomed.   HENT:      Head: Normocephalic and atraumatic.   Pulmonary:      Effort: Pulmonary effort is normal. No respiratory distress.   Skin:     Coloration: Skin is not pale.   Neurological:      Mental Status: She is alert and oriented to person, place, and time.      Gait: Gait normal.   Psychiatric:         Mood and Affect: Mood normal. " Affect is tearful.         Speech: Speech normal.         Behavior: Behavior is cooperative.         Judgment: Judgment normal.      Comments: She is tearful when discussing her stool incontinence        Result Review :           Assessment and Plan    Diagnoses and all orders for this visit:    1. Incontinence of feces, unspecified fecal incontinence type (Primary)    2. Bowel and bladder incontinence  -     Ambulatory Referral to Physical Therapy Evaluate and treat    3. Irritable bowel syndrome with diarrhea    4. Gastroesophageal reflux disease, unspecified whether esophagitis present    Other orders  -     cholestyramine (QUESTRAN) 4 GM/DOSE powder; Take 1 packet by mouth 2 (Two) Times a Day. mixed with a liquid  Dispense: 378 g; Refill: 3  -     omeprazole (priLOSEC) 40 MG capsule; Take 1 capsule by mouth Daily.  Dispense: 90 capsule; Refill: 3    Her stool and bladder leakage is understandably upsetting to her. We discussed testing to include anal rectal manometry and MR defecography to further assess the source of the incontinence. Likely this is multifactorial with her extensive spinal trauma, soft stools/diarrhea, and likely pelvic floor dysfunction. Given her spinal surgery that is scheduled for 2 weeks, she would like to hold off on this.    Recommended biofeedback physical therapy as a possibility to improve her symptoms. She would like to proceed with this and hopefully he can find a place that does it near her she lives in [place].    We also discussed that improving her stools may also further control the stool incontinence. We will change her Colestid to cholestyramine powder given the trouble swallowing she is having. If the trouble swallowing persist and/or worsen she will eventually need EGD. Recommend she hold the Movantik for 1 week to see if this makes any difference. If her symptoms worsen she will restart this. If no change or improvement, she will not restart this.    If still with stool  incontinence and soft stools after holding her Movantik, recommend she increase her cholestyramine to twice daily dosing to see if this helps.    We also had long discussion regarding how she should avoid taking this with her other medications as it will bind those medications. I also recommended that she take her levothyroxine by itself first thing in the morning and wait 1 hour to take the rest of her medications. She has been taking all her medications including the Colestid together.      Follow Up   Return in about 3 months (around 12/24/2021) for Allison ALVARES. We will give her time to heal after her spinal surgery before we see her back. I asked her to call me in the meantime if she needed anything.    Dragon dictation used throughout this note.     Allison Reid PA-C

## 2021-09-24 NOTE — PATIENT INSTRUCTIONS
Cholestyramine: take one hour after other medications OR 3 hours before other medications. Ideally take cholestyramine before meals.    Try to hold Movantik for 1 week to see if this changes bowel habits. If worse leakage/stools then restart.

## 2021-11-09 ENCOUNTER — TELEPHONE (OUTPATIENT)
Dept: GASTROENTEROLOGY | Facility: CLINIC | Age: 66
End: 2021-11-09

## 2021-11-09 NOTE — TELEPHONE ENCOUNTER
----- Message from [facility] Center[facility] SwiftSched Rep sent at 11/9/2021  3:48 PM EST -----  Regarding: Movantik 25 MG tablet AND cholestyramine (QUESTRAN) 4 GM/DOSE powder  Contact: [phone]  Patient is calling needing a refill for Movantik and wants to know if she can have the powder and the pills because sometimes she can take the powder and sometimes she cant.            [facility] - [place], [place] - [address] - [phone]  - [phone] FX   [address], [place] [address]   Phone:  [phone]  Fax:  [phone]

## 2021-11-10 RX ORDER — NALOXEGOL OXALATE 25 MG/1
25 TABLET, FILM COATED ORAL EVERY MORNING
Qty: 30 TABLET | Refills: 2 | Status: SHIPPED | OUTPATIENT
Start: 2021-11-10 | End: 2022-03-02

## 2021-11-10 NOTE — TELEPHONE ENCOUNTER
I refilled the Movantik pills but she is content to find out from her insurance if they will cover both because most do not.

## 2021-12-17 RX ORDER — MONTELUKAST SODIUM 4 MG/1
TABLET, CHEWABLE ORAL
Qty: 60 TABLET | Refills: 0 | Status: SHIPPED | OUTPATIENT
Start: 2021-12-17 | End: 2022-02-14

## 2021-12-27 ENCOUNTER — OFFICE VISIT (OUTPATIENT)
Dept: GASTROENTEROLOGY | Facility: CLINIC | Age: 66
End: 2021-12-27

## 2021-12-27 VITALS — BODY MASS INDEX: 30.77 KG/M2 | WEIGHT: 167.2 LBS | TEMPERATURE: 96.4 F | HEIGHT: 62 IN

## 2021-12-27 DIAGNOSIS — R32 BOWEL AND BLADDER INCONTINENCE: ICD-10-CM

## 2021-12-27 DIAGNOSIS — R15.9 INCONTINENCE OF FECES, UNSPECIFIED FECAL INCONTINENCE TYPE: Primary | ICD-10-CM

## 2021-12-27 DIAGNOSIS — K58.2 IRRITABLE BOWEL SYNDROME WITH BOTH CONSTIPATION AND DIARRHEA: ICD-10-CM

## 2021-12-27 DIAGNOSIS — K21.9 GASTROESOPHAGEAL REFLUX DISEASE, UNSPECIFIED WHETHER ESOPHAGITIS PRESENT: ICD-10-CM

## 2021-12-27 DIAGNOSIS — R15.9 BOWEL AND BLADDER INCONTINENCE: ICD-10-CM

## 2021-12-27 PROCEDURE — 99213 OFFICE O/P EST LOW 20 MIN: CPT | Performed by: PHYSICIAN ASSISTANT

## 2021-12-27 NOTE — PROGRESS NOTES
"Chief Complaint  Diarrhea, Fecal Incontinence, Nausea, and Vomiting    Subjective          History of Present Illness    Fanny Peerra is a  66 y.o. female presents for follow-up on stool incontinence.  She is a patient of Dr. Ramsay.  She last saw me on 9/24/2021.    She has history of mixed IBS and takes Movantik and colestipol 2 tablets every morning for this.  Sometimes she would take cholestyramine powder as she was having trouble swallowing the colestipol pills.  At last office visit she was reporting improved stools however some persistent stool leakage as well as bladder leakage which was distressing to her. We held off on anal rectal testing and PT due to spinal surgery (keyon exchange) which is now complete.  Still with stool and bladder incontinence.  She request to proceed with further testing.    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 She has rods from her cervical spine all the way down to her lumbar. She has recent spinal surgery on 10/4/2021 as one of the rods has broken.    GERD is controlled on omeprazole 20 mg daily and GERD diet. She does have trouble swallowing the colestid pills. No trouble with other pill, food, or liquids.    Last colonoscopy was done on 1/22/2019.  She also has history of cholecystectomy.    Objective   Vital Signs:   Temp 96.4 °F (35.8 °C)   Ht 157.5 cm (62\")   Wt 75.8 kg (167 lb 3.2 oz)   BMI 30.58 kg/m²       Physical Exam  Vitals reviewed.   Constitutional:       General: She is awake. She is not in acute distress.     Appearance: Normal appearance. She is well-developed and well-groomed.   HENT:      Head: Normocephalic and atraumatic.   Pulmonary:      Effort: Pulmonary effort is normal. No respiratory distress.   Skin:     Coloration: Skin is not pale.   Neurological:      Mental Status: She is alert and oriented to person, place, and time.      Gait: Gait normal.   Psychiatric:         Mood and " Affect: Mood and affect normal.         Speech: Speech normal.         Behavior: Behavior is cooperative.         Judgment: Judgment normal.          Result Review :             Assessment and Plan    Diagnoses and all orders for this visit:    1. Incontinence of feces, unspecified fecal incontinence type (Primary)  -     Anorectal Manometry    2. Bowel and bladder incontinence  -     Anorectal Manometry    3. Gastroesophageal reflux disease, unspecified whether esophagitis present    4. Irritable bowel syndrome with both constipation and diarrhea    Recommend proceeding with anorectal manometry and MR Defecographyt.  She has had MRIs in the past status post spinal surgery as the hardware is titanium.  She does have claustrophobia and will need an antianxiety medication.  I will request this to Dr. Ramsay.    Discussed that I suspect her incontinence is largely in part to her previous spinal surgeries.  Although she may have element of rectocele/cystocele complicating matters further.  She may benefit from biofeedback physical therapy in the testing above will help PT determine that.  She may also need to review testing with her neurosurgeon to see if there is anything they can offer to help.    Comes from 2 hours away so ideally needs later appointment.     She does have some trouble swallowing only with the Colestid pill.  She declines further work-up at this time given everything she has been through and in order to focus on this incontinence.    Follow Up   Return for Next available with Dr. Ramsay.    Hieu dictation used throughout this note.     Allison Reid PA-C

## 2021-12-28 ENCOUNTER — TELEPHONE (OUTPATIENT)
Dept: GASTROENTEROLOGY | Facility: CLINIC | Age: 66
End: 2021-12-28

## 2021-12-28 NOTE — TELEPHONE ENCOUNTER
----- Message from Allison Reid PA-C sent at 12/27/2021 11:52 AM EST -----  She will need MR Defecography with [facility] scheduled for Urinary and stool incontinence. Extensive spinal surgeries due to tumor in spine. Has titanium keyon but has had MRI's of spine since then.

## 2021-12-29 NOTE — TELEPHONE ENCOUNTER
Order faxed to [facility] outpt scheduling @ [phone] - confirmation received.  See media tab.    Call to pt.  Advise per RONNIE Reid note  Verb understanding.     Advise call [facility] outpt scheduling @ [phone] to arrange.

## 2022-01-03 ENCOUNTER — TELEPHONE (OUTPATIENT)
Dept: GASTROENTEROLOGY | Facility: CLINIC | Age: 67
End: 2022-01-03

## 2022-01-03 DIAGNOSIS — F41.8 ANXIETY ABOUT HEALTH: Primary | ICD-10-CM

## 2022-01-03 RX ORDER — DIAZEPAM 5 MG/1
5 TABLET ORAL
Qty: 1 TABLET | Refills: 0 | Status: SHIPPED | OUTPATIENT
Start: 2022-01-03 | End: 2022-01-03

## 2022-01-03 NOTE — TELEPHONE ENCOUNTER
----- Message from Trinity Bhatia sent at 1/3/2022 11:48 AM EST -----  Contact: [phone]  Pt is calling saying she is having a MRI done and she says she is claustrophobic and was told that she would have to conact her doctor to get meds for that by her being claustrophobic and getting that MRI done.

## 2022-01-04 ENCOUNTER — TELEPHONE (OUTPATIENT)
Dept: GASTROENTEROLOGY | Facility: CLINIC | Age: 67
End: 2022-01-04

## 2022-01-04 DIAGNOSIS — R15.9 INCONTINENCE OF FECES, UNSPECIFIED FECAL INCONTINENCE TYPE: ICD-10-CM

## 2022-01-04 DIAGNOSIS — M62.89 PELVIC FLOOR DYSFUNCTION: Primary | ICD-10-CM

## 2022-01-04 NOTE — TELEPHONE ENCOUNTER
----- Message from Trinity Whitney sent at 1/4/2022 11:02 AM EST -----  Regarding: MR Defecography  Contact: [phone]  Patti from [facility] called and said they do not do the  MR Defecography.

## 2022-01-04 NOTE — TELEPHONE ENCOUNTER
Per pt, she has been scheduled for MR defecography on 1/7.      Advise pt contact [facility] to confirm - does not want to drive 2 hours for nothing.     Update to RONNIE Reid.

## 2022-01-05 NOTE — TELEPHONE ENCOUNTER
"I called the [facility]  MRI supervisor Amari today @[phone].  I am waiting for him to call me back.  I have ordered many defecography's and [facility] the only place that does them.  So I am not sure if they stopped doing them or if \"Ynes\" give us the wrong information.  But I would like to confirm this.  We will get back to her with an answer either way.  SLM.      "

## 2022-01-05 NOTE — TELEPHONE ENCOUNTER
Amari left me voicemail today stating that they do indeed do MR defecography's at [facility].  This is the only facility that they do these in.  Possibly the person who called you is from an outlying facility?  Please make sure that patient is scheduled for [facility] for MR defecography.  Again I confirmed with Amari that they do still do this procedure.  Please let patient know above.  SLM.     **Call to pt.  Advise of above.  Verb understanding.  States called [facility] this am, and defecography has been scheduled for 1/21.  Update to RONNIE Reid.   Larisa Swift RN.

## 2022-01-06 RX ORDER — DIAZEPAM 5 MG/1
5 TABLET ORAL ONCE
Qty: 1 TABLET | Refills: 0 | Status: SHIPPED | OUTPATIENT
Start: 2022-01-06 | End: 2022-01-06

## 2022-01-24 NOTE — TELEPHONE ENCOUNTER
Call to [facility] Med Records @ [phone] and spoke with Promise.  No results found - questionable if pt kept appt.     Call to pt.   States did have defecography completed 1/21.     Will check back in a few days.

## 2022-01-27 NOTE — TELEPHONE ENCOUNTER
Call to [facility] Records and spoke with Martha.  Results not yet ready.     VM to Amari to check status.  Awaiting reply.

## 2022-01-31 NOTE — TELEPHONE ENCOUNTER
Call to  [facility] Records and spoke with Martha.  States results still not available.  She will send message to Manager to obtain results.

## 2022-02-14 RX ORDER — MONTELUKAST SODIUM 4 MG/1
TABLET, CHEWABLE ORAL
Qty: 180 TABLET | Refills: 3 | Status: SHIPPED | OUTPATIENT
Start: 2022-02-14 | End: 2022-06-01

## 2022-02-14 NOTE — TELEPHONE ENCOUNTER
I went ahead and refilled this however the Colestid is on back order in most pharmacies.  She might need to temporarily go back to cholestyramine.

## 2022-03-02 RX ORDER — NALOXEGOL OXALATE 25 MG/1
TABLET, FILM COATED ORAL
Qty: 30 TABLET | Refills: 1 | Status: SHIPPED | OUTPATIENT
Start: 2022-03-02 | End: 2022-05-05

## 2022-04-12 ENCOUNTER — OFFICE VISIT (OUTPATIENT)
Dept: GASTROENTEROLOGY | Facility: CLINIC | Age: 67
End: 2022-04-12

## 2022-04-12 VITALS — BODY MASS INDEX: 31.65 KG/M2 | TEMPERATURE: 97.3 F | WEIGHT: 172 LBS | HEIGHT: 62 IN

## 2022-04-12 DIAGNOSIS — K58.2 IRRITABLE BOWEL SYNDROME WITH BOTH CONSTIPATION AND DIARRHEA: ICD-10-CM

## 2022-04-12 DIAGNOSIS — R68.81 EARLY SATIETY: ICD-10-CM

## 2022-04-12 DIAGNOSIS — K21.9 GASTROESOPHAGEAL REFLUX DISEASE, UNSPECIFIED WHETHER ESOPHAGITIS PRESENT: Primary | ICD-10-CM

## 2022-04-12 DIAGNOSIS — M62.89 PELVIC FLOOR DYSFUNCTION: ICD-10-CM

## 2022-04-12 PROCEDURE — 99214 OFFICE O/P EST MOD 30 MIN: CPT | Performed by: INTERNAL MEDICINE

## 2022-04-12 RX ORDER — FAMOTIDINE 40 MG/1
40 TABLET, FILM COATED ORAL DAILY
Qty: 30 TABLET | Refills: 5 | Status: SHIPPED | OUTPATIENT
Start: 2022-04-12

## 2022-04-12 RX ORDER — OMEPRAZOLE 40 MG/1
40 CAPSULE, DELAYED RELEASE ORAL DAILY
Qty: 90 CAPSULE | Refills: 3 | Status: SHIPPED | OUTPATIENT
Start: 2022-04-12 | End: 2023-03-20

## 2022-04-12 NOTE — PROGRESS NOTES
Subjective   Chief Complaint   Patient presents with   • Irritable Bowel Syndrome   • Heartburn   • Diarrhea       Fanny Perera is a  66 y.o. female here for a follow up visit for IBS and fecal incontinence, heartburn.     Patient reports she is been seeing physical therapy in [place].  She has had 4 sessions so far and she is been doing her therapy at home as well.  So far she really feels like this is helping.  She continues to take Movantik 25 mg once a day and Colestid 2 g once daily.  Bowel movements are daily.  She reports that they are somewhat mushy but more formed than they have been in the past.  She tried Colestid previously and the texture was not palatable.    She reports early satiety.  She is scheduled to see a surgeon to talk about some scar tissue in her abdomen that she thinks may be contributing to this.  No nausea or vomiting.  She has gained weight over the pandemic.She reports heartburn - somewhat controlled with omeprazole 40 mg once daily.  She takes omeprazole in the morning.      2022 - MR defecography -mild pelvic floor laxity  C/s  - decreased anal sphincter tone        HPI  Past Medical History:   Diagnosis Date   • Cancer (HCC)    • Chronic back pain    • GERD (gastroesophageal reflux disease)    • Herpes simplex    • Migraine    • Thyroid disease      Past Surgical History:   Procedure Laterality Date   • BACK SURGERY     • BREAST AUGMENTATION     • BREAST LUMPECTOMY     •  SECTION     • CHOLECYSTECTOMY     • COLONOSCOPY  approx     normal per patient   • COLONOSCOPY N/A 2019    Procedure: COLONOSCOPY to cecum and TI;  Surgeon: Socorro Ramsay MD;  Location: [facility] ENDOSCOPY;  Service: Gastroenterology   • HYSTERECTOMY     • LUMBAR SPINE SURGERY      x 13   • TONSILLECTOMY     • WISDOM TOOTH EXTRACTION         Current Outpatient Medications:   •  acyclovir (ZOVIRAX) 5 % cream, Zovirax 5 % topical cream, Disp: , Rfl:   •  colestipol (COLESTID) 1 g  tablet, TAKE TWO TABLETS ONCE DAILY., Disp: 180 tablet, Rfl: 3  •  diclofenac (VOLTAREN) 1 % gel gel, Voltaren 1 % topical gel, Disp: , Rfl:   •  estradiol (ESTRACE) 2 MG tablet, estradiol 2 mg tablet, Disp: , Rfl:   •  gabapentin (NEURONTIN) 600 MG tablet, Take 600 mg by mouth Daily. Takes one tablet in the morning, one at lunch, and two at bedtime, Disp: , Rfl:   •  levothyroxine (SYNTHROID, LEVOTHROID) 88 MCG tablet, levothyroxine 88 mcg tablet, Disp: , Rfl:   •  lidocaine-prilocaine (EMLA) 2.5-2.5 % cream, APPLY 1-2 grams 3 TO 4 TIMES DAILY AS NEEDED, Disp: , Rfl: 5  •  LORazepam (ATIVAN) 1 MG tablet, lorazepam 1 mg tablet, Disp: , Rfl:   •  montelukast (SINGULAIR) 10 MG tablet, montelukast 10 mg tablet, Disp: , Rfl:   •  Movantik 25 MG tablet, TAKE ONE TABLET BY MOUTH EVERY MORNING., Disp: 30 tablet, Rfl: 1  •  nystatin (MYCOSTATIN) 070332 UNIT/GM cream, nystatin 100,000 unit/gram topical cream, Disp: , Rfl:   •  omeprazole (priLOSEC) 40 MG capsule, Take 1 capsule by mouth Daily., Disp: 90 capsule, Rfl: 3  •  ondansetron ODT (ZOFRAN-ODT) 8 MG disintegrating tablet, TAKE 1 TABLET EVERY 8 HOURS AS NEEDED FOR NAUSEA AND VOMITING, Disp: , Rfl: 1  •  oxyCODONE (ROXICODONE) 15 MG immediate release tablet, oxycodone 15 mg tablet, Disp: , Rfl:   •  oxyCODONE ER (oxyCONTIN) 40 MG 12 hr tablet, OxyContin 40 mg tablet,crush resistant,extended release, Disp: , Rfl:   •  rizatriptan (MAXALT) 10 MG tablet, rizatriptan 10 mg tablet, Disp: , Rfl:   •  tiZANidine (ZANAFLEX) 4 MG tablet, Take 1/2 tablet by mouth in the morning and (1 & 1/2) tablets in the evening, as needed, Disp: , Rfl: 0  •  triamcinolone (KENALOG) 0.1 % ointment, APPLY TO EXTERNAL EAR CANAL TWICE DAILY AS NEEDED, Disp: , Rfl: 2  •  umeclidinium-vilanterol (ANORO ELLIPTA) 62.5-25 MCG/INH aerosol powder  inhaler, Anoro Ellipta 62.5 mcg-25 mcg/actuation powder for inhalation, Disp: , Rfl:   •  valACYclovir (VALTREX) 1000 MG tablet, valacyclovir 1 gram tablet,  Disp: , Rfl:   •  famotidine (Pepcid) 40 MG tablet, Take 1 tablet by mouth Daily., Disp: 30 tablet, Rfl: 5  PRN Meds:.  No Known Allergies  Social History     Socioeconomic History   • Marital status:    Tobacco Use   • Smoking status: Former Smoker     Packs/day: 0.50     Types: Cigarettes     Start date:      Quit date:      Years since quittin.2   • Smokeless tobacco: Never Used   Substance and Sexual Activity   • Alcohol use: Yes     Alcohol/week: 1.0 standard drink     Types: 1 Glasses of wine per week     Comment: [email or web address]   • Drug use: No   • Sexual activity: Defer     History reviewed. No pertinent family history.  Review of Systems   Constitutional: Negative for appetite change and unexpected weight change.   Gastrointestinal: Negative for abdominal pain, blood in stool, nausea and vomiting.     Vitals:    22 1330   Temp: 97.3 °F (36.3 °C)         22  1330   Weight: 78 kg (172 lb)       Objective   Physical Exam  Constitutional:       Appearance: Normal appearance. She is well-developed.   HENT:      Head: Normocephalic and atraumatic.   Eyes:      General: No scleral icterus.     Conjunctiva/sclera: Conjunctivae normal.   Pulmonary:      Effort: Pulmonary effort is normal.   Abdominal:      General: There is no distension.      Palpations: Abdomen is soft.   Musculoskeletal:      Cervical back: Normal range of motion and neck supple.   Skin:     General: Skin is warm and dry.   Neurological:      Mental Status: She is alert.   Psychiatric:         Mood and Affect: Mood normal.         Behavior: Behavior normal.       No radiology results for the last 7 days    Assessment/Plan   Diagnoses and all orders for this visit:    Gastroesophageal reflux disease, unspecified whether esophagitis present    Early satiety    Irritable bowel syndrome with both constipation and diarrhea    Pelvic floor dysfunction    Other orders  -     omeprazole (priLOSEC) 40 MG capsule; Take 1 capsule by  mouth Daily.  -     famotidine (Pepcid) 40 MG tablet; Take 1 tablet by mouth Daily.      Plan:  · Bowel symptoms have improved with a combination of medication and pelvic floor therapy.  Continue same.  Recommend trying to add a half a scoop of cholestyramine to see if this will further bulk her stools and prevent additional seepage.  Continue pelvic floor PT.  · Continue omeprazole in the morning and add Pepcid in the evening.  Recommend that she do this consistently least for 4 weeks and see if this helps with her early satiety.  If her symptoms continue, recommend EGD.  She will contact me if she continues to have symptoms given that she lives in [facility].  · Continue GERD diet lifestyle modification

## 2022-05-05 RX ORDER — NALOXEGOL OXALATE 25 MG/1
TABLET, FILM COATED ORAL
Qty: 30 TABLET | Refills: 11 | Status: SHIPPED | OUTPATIENT
Start: 2022-05-05 | End: 2023-01-03

## 2022-06-01 RX ORDER — MONTELUKAST SODIUM 4 MG/1
TABLET, CHEWABLE ORAL
Qty: 180 TABLET | Refills: 1 | Status: SHIPPED | OUTPATIENT
Start: 2022-06-01 | End: 2022-08-24

## 2022-06-15 ENCOUNTER — TELEPHONE (OUTPATIENT)
Dept: GASTROENTEROLOGY | Facility: CLINIC | Age: 67
End: 2022-06-15

## 2022-06-15 DIAGNOSIS — R32 BOWEL AND BLADDER INCONTINENCE: ICD-10-CM

## 2022-06-15 DIAGNOSIS — R15.9 BOWEL AND BLADDER INCONTINENCE: ICD-10-CM

## 2022-06-15 DIAGNOSIS — R15.1 FECAL SMEARING: ICD-10-CM

## 2022-06-15 DIAGNOSIS — M62.89 PELVIC FLOOR DYSFUNCTION: Primary | ICD-10-CM

## 2022-06-15 DIAGNOSIS — R15.9 INCONTINENCE OF FECES, UNSPECIFIED FECAL INCONTINENCE TYPE: ICD-10-CM

## 2022-06-15 NOTE — TELEPHONE ENCOUNTER
Faxed note received from pt requesting appt.     Call to pt.  Has scheduled appt with RONNIE Reid for 7/20 @ 2:30 pm.      Tearful.  States taking colestid 2 tabs daily, movantik 25 mg daily, has completed therapy and continues to experience bowel leakage at least every other day.      Asking what else she can try to manage symptoms until upcoming appt.  [facility] confirmed.     Message to Dr Ramsay.

## 2022-06-20 NOTE — TELEPHONE ENCOUNTER
No, neither fatty liver nor kidney cyst will cause any sort of stool leakage.  The stool leakage is likely pelvic floor dysfunction and the inability to completely close her anal sphincter.  Top this off with loose stools and she is a perfect candidate for stool leakage unfortunately.  Dr. Ramsay has been trying to bulk up her stools which will be the best hope to keep her from leaking along with the pelvic floor physical therapy.

## 2022-06-20 NOTE — TELEPHONE ENCOUNTER
Called pt and pt reports that she had an mri done on 06/08 ( found under media tab).  She states that it showed renal cysts and a fatty liver.  She is asking if this could be causing her stool leakage. ADvised will send message to Allison VEGA.  Verb understanding.

## 2022-06-21 NOTE — TELEPHONE ENCOUNTER
Called pt and advised of Allison VEGA's note. Verb understanding.     Pt reports that nothing his helping, she states she has been doing the exercises for months and it has not helped.   She states that there has to be something. Advised will send message to Allison VEGA.

## 2022-06-21 NOTE — TELEPHONE ENCOUNTER
Called pt and advised of Allison VEGA's note. Verb understanding.     Pt states for now she will wait on the referral and will see what Dr Ramsay has to say.   Update sent to Allison VEGA.   14-Apr-2022 17:03

## 2022-06-21 NOTE — TELEPHONE ENCOUNTER
It was a recommendation that I have at this point is to send her to colorectal surgery and see if they have any recommendations.  Unfortunately she has significant spinal trauma which effects closure of the anal opening and bulking up her stool is a best thing we can do to minimize leakage.  I would send to Dr. Diaz if she is agreeable although not sure that they will be able to do much.  Also, I will send this message to Dr. Ramsay to look at next week when she gets back in for any further recommendations on her part.

## 2022-07-27 RX ORDER — MONTELUKAST SODIUM 4 MG/1
TABLET, CHEWABLE ORAL
Qty: 60 TABLET | Refills: 0 | OUTPATIENT
Start: 2022-07-27

## 2022-08-24 RX ORDER — MONTELUKAST SODIUM 4 MG/1
TABLET, CHEWABLE ORAL
Qty: 180 TABLET | Refills: 1 | Status: SHIPPED | OUTPATIENT
Start: 2022-08-24 | End: 2022-12-08

## 2022-10-19 ENCOUNTER — TELEPHONE (OUTPATIENT)
Dept: GASTROENTEROLOGY | Facility: CLINIC | Age: 67
End: 2022-10-19

## 2022-10-19 NOTE — TELEPHONE ENCOUNTER
Caller: Dilma Fanny    Relationship: Self    Best call back number: [phone]    Who are you requesting to speak with (clinical staff, provider,  specific staff member): CLINICAL STAFF/DR JONES/ALMAS MAN PA-C    What was the call regarding: PT WANTS TO KNOW NAME/LOCATION/NUMBER OF DR YOU REFERRED HER TO PREVIOUSLY    Do you require a callback: YES

## 2022-10-20 ENCOUNTER — TELEPHONE (OUTPATIENT)
Dept: GASTROENTEROLOGY | Facility: CLINIC | Age: 67
End: 2022-10-20

## 2022-10-20 NOTE — TELEPHONE ENCOUNTER
Returned call to pt and advised that Allison VEGA had referred her to Dr Yesica Diaz at [phone].  Pt verb understanding.

## 2022-10-20 NOTE — TELEPHONE ENCOUNTER
Caller: Fanny Perera    Relationship to patient: Self    Best call back number: [phone]    Patient is needing: PT MISSED NURSE CALL AND IS REQUESTING CALL BACK.

## 2022-10-20 NOTE — TELEPHONE ENCOUNTER
Hub staff attempted to follow warm transfer process and was unsuccessful     Caller: Fanny Perera    Relationship to patient: Self    Best call back number: [phone]    Patient is needing: PT IS RETURNING CALL

## 2022-11-04 ENCOUNTER — TELEPHONE (OUTPATIENT)
Dept: GASTROENTEROLOGY | Facility: CLINIC | Age: 67
End: 2022-11-04

## 2022-11-04 NOTE — TELEPHONE ENCOUNTER
Caller: Fanny Perera    Relationship to patient: Self    Best call back number: [phone]    Patient is needing: PT WAS REFERRED TO DR. ZELAYA BUT CAN NOT GET IN TO SEE HER TILL 11/18/22 PT IS HAVING PROBLEMS WITH BOWL MOVEMENTS AND CAN NOT LEAVE HER HOUSE BECAUSE SHE CANNOT HOLD IT INPT WOULD LIKE TO SEE IF SOME MEDICATION CAN BE CALLED IN UNTIL SHE CAN SEE

## 2022-11-07 NOTE — TELEPHONE ENCOUNTER
"Called pt and advised of Dr Ramsay's note. Verb understanding.     Pt denies any abd pain or fever.  Pt also states \" she knows she is note impacted\".  Advised will update Dr Ramsay.   "

## 2022-11-07 NOTE — TELEPHONE ENCOUNTER
I am concerned that she is impacted and having overflow diarrhea based on the amounts of antidiarrheals that she is taking.  Recommend cholestyramine 1 scoop twice daily.  She is on the maximum amount of Imodium.  She is on the maximum amount of Colestid.  This is a significant change from her baseline.  If she having abdominal pain or fever.  May need to check stool studies to make sure there is not something additional going on

## 2022-11-07 NOTE — TELEPHONE ENCOUNTER
Called pt and pt reports that she is having diarrhea and is incontinent of stool.   She feels like she can not handle it.  She is taking imodium 8 per day. She reports it is not helping.  Pt also taking colestipol 2 tabs bid.  Pt also taking a powder too.  Pt denies any recent antibx.  Pt denies a fever and chills.  Pt reports that the is having diarrhea about 10 times a day.   Pt does have upcoming appt with Dr Diaz but is asking what can she take until then . Advised will send message to Dr Ramsay.   Verb  Understanding.

## 2022-11-18 ENCOUNTER — OFFICE VISIT (OUTPATIENT)
Dept: SURGERY | Facility: CLINIC | Age: 67
End: 2022-11-18

## 2022-11-18 VITALS
HEART RATE: 82 BPM | BODY MASS INDEX: 30.55 KG/M2 | WEIGHT: 166 LBS | TEMPERATURE: 97.3 F | OXYGEN SATURATION: 100 % | HEIGHT: 62 IN | SYSTOLIC BLOOD PRESSURE: 108 MMHG | DIASTOLIC BLOOD PRESSURE: 68 MMHG

## 2022-11-18 DIAGNOSIS — R15.9 INCONTINENCE OF FECES, UNSPECIFIED FECAL INCONTINENCE TYPE: Primary | ICD-10-CM

## 2022-11-18 PROBLEM — M54.12 CERVICAL RADICULOPATHY: Status: ACTIVE | Noted: 2019-06-19

## 2022-11-18 PROBLEM — S19.9XXA: Status: ACTIVE | Noted: 2022-11-18

## 2022-11-18 PROBLEM — T14.8XXA INJURY TO NERVES: Status: ACTIVE | Noted: 2022-11-18

## 2022-11-18 PROBLEM — M85.80 OSTEOPENIA: Status: ACTIVE | Noted: 2020-02-28

## 2022-11-18 PROBLEM — M96.0 PSEUDARTHROSIS AFTER FUSION OR ARTHRODESIS: Status: ACTIVE | Noted: 2018-12-21

## 2022-11-18 PROBLEM — Z01.818 PREOPERATIVE EVALUATION TO RULE OUT SURGICAL CONTRAINDICATION: Status: ACTIVE | Noted: 2017-06-20

## 2022-11-18 PROCEDURE — 99204 OFFICE O/P NEW MOD 45 MIN: CPT | Performed by: COLON & RECTAL SURGERY

## 2022-11-18 RX ORDER — CELECOXIB 200 MG/1
CAPSULE ORAL
COMMUNITY
End: 2023-01-13

## 2022-11-18 RX ORDER — METHYLPREDNISOLONE 4 MG/1
TABLET ORAL
COMMUNITY
Start: 2022-11-08 | End: 2022-11-18

## 2022-11-18 RX ORDER — VENLAFAXINE HYDROCHLORIDE 37.5 MG/1
CAPSULE, EXTENDED RELEASE ORAL
COMMUNITY
End: 2022-11-18

## 2022-11-18 RX ORDER — AMITRIPTYLINE HYDROCHLORIDE 10 MG/1
10 TABLET, FILM COATED ORAL NIGHTLY
COMMUNITY
Start: 2022-08-23

## 2022-11-18 RX ORDER — CLINDAMYCIN HYDROCHLORIDE 300 MG/1
CAPSULE ORAL
COMMUNITY
End: 2023-02-20

## 2022-11-18 RX ORDER — FERROUS SULFATE 325(65) MG
TABLET ORAL
COMMUNITY
Start: 2022-11-08 | End: 2023-02-20

## 2022-11-18 RX ORDER — NABUMETONE 750 MG/1
TABLET, FILM COATED ORAL
COMMUNITY
End: 2022-11-18

## 2022-11-18 RX ORDER — FLUTICASONE PROPIONATE 50 MCG
SPRAY, SUSPENSION (ML) NASAL
COMMUNITY
End: 2023-01-13

## 2022-11-18 RX ORDER — MELOXICAM 15 MG/1
15 TABLET ORAL DAILY
COMMUNITY
Start: 2022-11-03

## 2022-11-18 RX ORDER — CEPHALEXIN 500 MG/1
CAPSULE ORAL
COMMUNITY
End: 2022-11-18

## 2022-11-18 NOTE — PROGRESS NOTES
"Fanny Perera is a 67 y.o. female who is seen as a consult at the request of Allison Reid PA-C for Diarrhea.      HPI:  The patient has been having trouble with incontinence for over a year. She denies any changes in her stool consistency or diarrhea. The patient states it is embarrassing and she does not leave the house. She has taken too much Imodium and it did not help. The patient continues to experience leakage. It was not happening for a while, but now it is more frequent. The patient has a bowel movement every day. Her stool is \"not hard, but it is not running\" and it is \"soft and stays together\". She reports she eats fiber all the time. The patient continues to take colestipol and Movantik as prescribed by Dr. Ramsay. She is not constipated.     The patient reports experiencing bladder leakage and cannot hold her urine if she has to use the restroom or laughs. The patient states she had an \"electric box\" on her buttocks and it did not help. She reports doing the appropriate exercises for it    The patient has had 14 spine surgeries and 2 C-sections. She was in labor with her first child for a week and had a bowel movement inside her. The patient continues to take oxycodone, meloxicam, omeprazole. estradiol and thyroid and cholesterol medication.    Past Medical History:   Diagnosis Date   • Abnormal bowel habits 01/02/2019   • Bladder incontinence 12/2021   • Cancer (HCC)    • Carpal tunnel syndrome    • Cervical radiculopathy 06/19/2019   • Cervical spondylosis    • Chronic back pain    • Chronic narcotic use    • Distal radius fracture 10/15/2014   • Early satiety 04/2022   • Fecal smearing 01/02/2019   • GERD (gastroesophageal reflux disease)    • Hepatic steatosis 06/08/2022    FOUND ON MRI OF ABDOMEN   • Herpes simplex    • IBS (irritable bowel syndrome)    • Injury of posterior neck 11/18/2022    neck/back injury   • Injury to nerves 11/18/2022   • Lumbar radiculopathy    • Malunion of " fracture 10/15/2014   • Migraine    • Osteopenia 2020   • Pelvic floor dysfunction    • Pseudarthrosis after fusion or arthrodesis 2018   • Renal cyst 2022    A FEW SMALL BILATERAL RENAL CYSTS, FOUND ON MRI OF ABDOMEN   • Scoliosis    • Spasmodic torticollis 2019   • Thoracic radiculopathy    • Thyroid disease        Past Surgical History:   Procedure Laterality Date   • ANORECTAL MANOMETRY N/A 2009   • BACK SURGERY N/A 10/30/2006    DR. GREGORIO RICHMOND AT [place]   • BREAST AUGMENTATION Bilateral    • BREAST LUMPECTOMY     • CARPAL TUNNEL RELEASE Right 2012    DR. JONATHON VERDIN AT [facility]   •  SECTION N/A    • CHOLECYSTECTOMY N/A    • COLONOSCOPY N/A     normal per patient   • COLONOSCOPY N/A 2019    RANDOM BIOPSIES BENIGN, GRADE 1 HEMORRHOIDS, DECREASED ANAL SPHINCTER TONE, RESCOPE IN 10 YRS, DR. ABRAHAM JONES AT [facility]   • ENDOSCOPY N/A 2014    MILD INFLAMMATION, OTHERWISE WNL, DR. TIMMY AIKEN AT [facility]   • HYSTERECTOMY N/A    • LUMBAR SPINE SURGERY      x 13   • LUMBAR SPINE SURGERY N/A 2003    DR. GREGORIO RICHMOND AT [place]   • LUMBAR SPINE SURGERY N/A 2002    DR. GREGORIO RICHMOND AT [place]   • LUMBAR SPINE SURGERY N/A 2001    DR. GREGORIO RICHMOND AT [place]   • LUMBAR SPINE SURGERY N/A 2001    DR. GREGORIO RICHMOND AT [place]   • ORIF WRIST FRACTURE Right 10/15/2014    DR. PERRY NICKERSON AT [place]   • RECTAL SURGERY N/A 2022    DEFECOGRAPHY: SHOWED MILD PELVIC LAXITY WITHOUT ORGAN PROLAPSE, MODERATE DESCENT OF PELVIC FLOOR MUSCLES AS A UNIT BELOW THE PUBOCOCCYGEAL LINE, DR. PREMA PIERCE AT [facility]   • TONSILLECTOMY Bilateral    • WISDOM TOOTH EXTRACTION Bilateral        Social History:   reports that she quit smoking about 26 years ago. Her smoking use included cigarettes. She started smoking about 50 years ago. She smoked an average of .5 packs per day. She has been exposed to tobacco smoke. She has never used smokeless tobacco. She  reports current alcohol use of about 1.0 standard drink per week. She reports that she does not use drugs.      Marriage status:     History reviewed. No pertinent family history.      Current Outpatient Medications:   •  acyclovir (ZOVIRAX) 5 % cream, Zovirax 5 % topical cream, Disp: , Rfl:   •  amitriptyline (ELAVIL) 10 MG tablet, , Disp: , Rfl:   •  celecoxib (CeleBREX) 200 MG capsule, celecoxib 200 mg capsule, Disp: , Rfl:   •  colestipol (COLESTID) 1 g tablet, TAKE TWO TABLETS ONCE DAILY., Disp: 180 tablet, Rfl: 1  •  diclofenac (VOLTAREN) 1 % gel gel, Voltaren 1 % topical gel, Disp: , Rfl:   •  estradiol (ESTRACE) 2 MG tablet, estradiol 2 mg tablet, Disp: , Rfl:   •  famotidine (Pepcid) 40 MG tablet, Take 1 tablet by mouth Daily., Disp: 30 tablet, Rfl: 5  •  FeroSul 325 (65 Fe) MG tablet, , Disp: , Rfl:   •  fluticasone (FLONASE) 50 MCG/ACT nasal spray, fluticasone propionate 50 mcg/actuation nasal spray,suspension  SHAKE LIQUID AND USE 2 SPRAYS IN EACH NOSTRIL DAILY, Disp: , Rfl:   •  gabapentin (NEURONTIN) 600 MG tablet, Take 600 mg by mouth Daily. Takes one tablet in the morning, one at lunch, and two at bedtime, Disp: , Rfl:   •  levothyroxine (SYNTHROID, LEVOTHROID) 88 MCG tablet, levothyroxine 88 mcg tablet, Disp: , Rfl:   •  lidocaine-prilocaine (EMLA) 2.5-2.5 % cream, APPLY 1-2 grams 3 TO 4 TIMES DAILY AS NEEDED, Disp: , Rfl: 5  •  meloxicam (MOBIC) 15 MG tablet, , Disp: , Rfl:   •  montelukast (SINGULAIR) 10 MG tablet, montelukast 10 mg tablet, Disp: , Rfl:   •  Movantik 25 MG tablet, TAKE ONE TABLET BY MOUTH EVERY MORNING., Disp: 30 tablet, Rfl: 11  •  omeprazole (priLOSEC) 40 MG capsule, Take 1 capsule by mouth Daily., Disp: 90 capsule, Rfl: 3  •  ondansetron ODT (ZOFRAN-ODT) 8 MG disintegrating tablet, TAKE 1 TABLET EVERY 8 HOURS AS NEEDED FOR NAUSEA AND VOMITING, Disp: , Rfl: 1  •  oxyCODONE (ROXICODONE) 15 MG immediate release tablet, oxycodone 15 mg tablet, Disp: , Rfl:   •  oxyCODONE ER  (oxyCONTIN) 40 MG 12 hr tablet, OxyContin 40 mg tablet,crush resistant,extended release, Disp: , Rfl:   •  valACYclovir (VALTREX) 1000 MG tablet, valacyclovir 1 gram tablet, Disp: , Rfl:   •  clindamycin (CLEOCIN) 300 MG capsule, clindamycin HCl 300 mg capsule, Disp: , Rfl:     Allergy  Adhesive tape and Latex    Review of Systems   Constitutional: Negative for decreased appetite and weight gain.   HENT: Negative for congestion, hearing loss and hoarse voice.    Eyes: Negative for blurred vision, discharge and visual disturbance.   Cardiovascular: Negative for chest pain, cyanosis and leg swelling.   Respiratory: Negative for cough, shortness of breath, sleep disturbances due to breathing and snoring.    Endocrine: Positive for cold intolerance. Negative for heat intolerance.   Hematologic/Lymphatic: Bruises/bleeds easily.   Skin: Negative for itching, poor wound healing and skin cancer.   Musculoskeletal: Negative for arthritis, back pain, joint pain and joint swelling.   Gastrointestinal: Negative for abdominal pain, change in bowel habit, bowel incontinence and constipation.   Genitourinary: Negative for bladder incontinence, dysuria and hematuria.   Neurological: Positive for loss of balance. Negative for brief paralysis, excessive daytime sleepiness, dizziness, focal weakness, headaches, light-headedness and weakness.   Psychiatric/Behavioral: Negative for altered mental status and hallucinations. The patient does not have insomnia.    Allergic/Immunologic: Positive for environmental allergies. Negative for HIV exposure and persistent infections.   All other systems reviewed and are negative.      Vitals:    11/18/22 1338   BP: 108/68   Pulse: 82   Temp: 97.3 °F (36.3 °C)   SpO2: 100%     Body mass index is 30.36 kg/m².    Physical Exam  Exam conducted with a chaperone present.   Constitutional:       General: She is not in acute distress.     Appearance: She is well-developed.   HENT:      Head: Normocephalic  and atraumatic.      Nose: Nose normal.   Eyes:      Conjunctiva/sclera: Conjunctivae normal.      Pupils: Pupils are equal, round, and reactive to light.   Neck:      Trachea: No tracheal deviation.   Pulmonary:      Effort: Pulmonary effort is normal. No respiratory distress.      Breath sounds: Normal breath sounds.   Abdominal:      General: Bowel sounds are normal. There is no distension.      Palpations: Abdomen is soft.   Genitourinary:     Comments: Perianal skin: Within normal limits.  Digital rectal exam: Significant decreased tone. No masses felt.  Musculoskeletal:         General: No deformity. Normal range of motion.      Cervical back: Normal range of motion.   Skin:     General: Skin is warm and dry.   Neurological:      Mental Status: She is alert and oriented to person, place, and time.      Cranial Nerves: No cranial nerve deficit.      Coordination: Coordination normal.      Gait: Gait normal.   Psychiatric:         Behavior: Behavior normal.         Judgment: Judgment normal.         Review of Medical Record:  The patient sees Dr. Ramsay. The patient has had incontinence. She saw SILVIA Marin on 12/27/2021 and was recommended to do manometry. She had an MRI which revealed pelvic floor laxity, no evidence of a cystocele, rectocele, or cul-de-sac and abnormality. Colonoscopy done 01/22/2019 was negative. The patient has been started on colestipol 08/24/2022. She has also had a prescription for clindamycin. The patient is on oxycodone    Assessment:  1. Incontinence of feces, unspecified fecal incontinence type         Plan:   We discussed further fecal incontinence and SNS and patient will return in 2 weeks after stopping the Movantik and we will discuss further after she has had some time to research sacral nerve stimulator.     Transcribed from ambient dictation for Yesica Diaz MD by Tab Lyon.  11/18/22   17:00 EST    Patient or patient representative verbalized consent to the  visit recording.  I have personally performed the services described in this document as transcribed by the above individual, and it is both accurate and complete.

## 2022-12-08 RX ORDER — MONTELUKAST SODIUM 4 MG/1
TABLET, CHEWABLE ORAL
Qty: 60 TABLET | Refills: 0 | Status: SHIPPED | OUTPATIENT
Start: 2022-12-08 | End: 2023-01-19

## 2023-01-03 RX ORDER — NALOXEGOL OXALATE 25 MG/1
TABLET, FILM COATED ORAL
Qty: 30 TABLET | Refills: 3 | Status: SHIPPED | OUTPATIENT
Start: 2023-01-03 | End: 2023-01-13

## 2023-01-13 ENCOUNTER — OFFICE VISIT (OUTPATIENT)
Dept: SURGERY | Facility: CLINIC | Age: 68
End: 2023-01-13
Payer: MEDICARE

## 2023-01-13 VITALS
HEIGHT: 62 IN | WEIGHT: 162.6 LBS | DIASTOLIC BLOOD PRESSURE: 102 MMHG | OXYGEN SATURATION: 100 % | HEART RATE: 81 BPM | BODY MASS INDEX: 29.92 KG/M2 | SYSTOLIC BLOOD PRESSURE: 140 MMHG | TEMPERATURE: 97.8 F

## 2023-01-13 DIAGNOSIS — R15.9 INCONTINENCE OF FECES, UNSPECIFIED FECAL INCONTINENCE TYPE: Primary | ICD-10-CM

## 2023-01-13 PROCEDURE — 99213 OFFICE O/P EST LOW 20 MIN: CPT | Performed by: COLON & RECTAL SURGERY

## 2023-01-13 RX ORDER — CEFAZOLIN SODIUM 2 G/100ML
2 INJECTION, SOLUTION INTRAVENOUS ONCE
Status: CANCELLED | OUTPATIENT
Start: 2023-03-02 | End: 2023-01-13

## 2023-01-13 RX ORDER — AZITHROMYCIN 250 MG/1
TABLET, FILM COATED ORAL
COMMUNITY
Start: 2022-12-12 | End: 2023-01-13

## 2023-01-13 RX ORDER — SODIUM CHLORIDE 0.9 % (FLUSH) 0.9 %
10 SYRINGE (ML) INJECTION EVERY 12 HOURS SCHEDULED
Status: CANCELLED | OUTPATIENT
Start: 2023-03-09

## 2023-01-13 RX ORDER — SODIUM CHLORIDE 9 MG/ML
40 INJECTION, SOLUTION INTRAVENOUS AS NEEDED
Status: CANCELLED | OUTPATIENT
Start: 2023-03-09

## 2023-01-13 RX ORDER — PREDNISONE 1 MG/1
TABLET ORAL
COMMUNITY
End: 2023-02-20

## 2023-01-13 RX ORDER — CEFAZOLIN SODIUM 2 G/100ML
2 INJECTION, SOLUTION INTRAVENOUS ONCE
Status: CANCELLED | OUTPATIENT
Start: 2023-03-09 | End: 2023-01-13

## 2023-01-13 RX ORDER — SODIUM CHLORIDE 0.9 % (FLUSH) 0.9 %
10 SYRINGE (ML) INJECTION AS NEEDED
Status: CANCELLED | OUTPATIENT
Start: 2023-03-09

## 2023-01-13 RX ORDER — DULOXETIN HYDROCHLORIDE 30 MG/1
CAPSULE, DELAYED RELEASE ORAL
COMMUNITY
Start: 2022-12-15 | End: 2023-02-20

## 2023-01-13 NOTE — PROGRESS NOTES
"Fanny Perera is a 67 y.o. female in for follow up of fecal incontinence.    HPI:  The patient was seen on 11/18/2022 for pain. The plan was to stop Movantik and to return after 2 weeks to discuss further sacral nerve stimulator. The patient requested a refill of her Movantik to Dr. Ramsay on 01/03/2023.    The patient reports that she is doing well. She states that she is no longer taking the Movantik. She reports that it has helped a little. The patient reports that she is having an accident with her bowels all the time. She states that if she has a bowel movement in the morning, she has to deal with that all day long. The patient reports that she is wearing pads and changing pads. She states that she goes through 5 or 6 pads a day. The patient reports that she does not have to go to the bathroom that many times. She states that once she started having the leakage, it is just pretty often. The patient reports that she has had a pain pump implant.      BP (!) 140/102 (BP Location: Left arm, Patient Position: Sitting, Cuff Size: Small Adult)   Pulse 81   Temp 97.8 °F (36.6 °C) (Temporal)   Ht 157.5 cm (62\")   Wt 73.8 kg (162 lb 9.6 oz)   LMP  (LMP Unknown)   SpO2 100%   Breastfeeding No   BMI 29.74 kg/m²   Body mass index is 29.74 kg/m².      PE:  Physical Exam  Constitutional:       General: She is not in acute distress.     Appearance: She is well-developed.   HENT:      Head: Normocephalic and atraumatic.   Abdominal:      General: There is no distension.      Palpations: Abdomen is soft.   Musculoskeletal:         General: Normal range of motion.   Neurological:      Mental Status: She is alert.   Psychiatric:         Thought Content: Thought content normal.         Assessment: No diagnosis found.   Fecal incontinence     Plan:  I explained to the patient that the nerve stimulator can not fix everything, so it is not 100 percent. The goal is to help at least decrease the amount of leakage episodes by " 50 percent. It helps some of the urine symptoms too. We do a test week, so we put the nerve stimulator in and she will wear a battery pack for a week on the outside. We have to keep that record for that week and compare it to what the record that she has before and see if it has helped 50 percent of the time. If it has, then we do the put the battery on the inside and it lasts for somewhere around 10 years, so we check it intermittently to make sure that it is still working.  I explained to the patient that an incision is made, it is about 3 inches long right below her belt line. We do it in the operating room, so it is completely sterile. The chance of getting an infection in the lead or in the pocket is less than 2 percent.      Transcribed from ambient dictation for Yesica Diaz MD by Dolores Schulte.  01/13/23   12:53 EST    Patient or patient representative verbalized consent to the visit recording.    This patient was evaluated by me, recommendations made, documentation reviewed, edited, and revised by me, Yesica Diaz MD

## 2023-01-19 RX ORDER — MONTELUKAST SODIUM 4 MG/1
TABLET, CHEWABLE ORAL
Qty: 60 TABLET | Refills: 0 | Status: SHIPPED | OUTPATIENT
Start: 2023-01-19 | End: 2023-02-23

## 2023-02-20 ENCOUNTER — PRE-ADMISSION TESTING (OUTPATIENT)
Dept: PREADMISSION TESTING | Facility: HOSPITAL | Age: 68
End: 2023-02-20
Payer: MEDICARE

## 2023-02-20 VITALS
TEMPERATURE: 97.3 F | HEIGHT: 62 IN | OXYGEN SATURATION: 98 % | HEART RATE: 79 BPM | DIASTOLIC BLOOD PRESSURE: 84 MMHG | WEIGHT: 166.9 LBS | BODY MASS INDEX: 30.71 KG/M2 | RESPIRATION RATE: 16 BRPM | SYSTOLIC BLOOD PRESSURE: 137 MMHG

## 2023-02-20 LAB
ANION GAP SERPL CALCULATED.3IONS-SCNC: 9 MMOL/L (ref 5–15)
BUN SERPL-MCNC: 21 MG/DL (ref 8–23)
BUN/CREAT SERPL: 35.6 (ref 7–25)
CALCIUM SPEC-SCNC: 8.9 MG/DL (ref 8.6–10.5)
CHLORIDE SERPL-SCNC: 101 MMOL/L (ref 98–107)
CO2 SERPL-SCNC: 26 MMOL/L (ref 22–29)
CREAT SERPL-MCNC: 0.59 MG/DL (ref 0.57–1)
DEPRECATED RDW RBC AUTO: 38.9 FL (ref 37–54)
EGFRCR SERPLBLD CKD-EPI 2021: 98.3 ML/MIN/1.73
ERYTHROCYTE [DISTWIDTH] IN BLOOD BY AUTOMATED COUNT: 12.4 % (ref 12.3–15.4)
GLUCOSE SERPL-MCNC: 106 MG/DL (ref 65–99)
HCT VFR BLD AUTO: 40.3 % (ref 34–46.6)
HGB BLD-MCNC: 13.7 G/DL (ref 12–15.9)
MCH RBC QN AUTO: 29.3 PG (ref 26.6–33)
MCHC RBC AUTO-ENTMCNC: 34 G/DL (ref 31.5–35.7)
MCV RBC AUTO: 86.1 FL (ref 79–97)
PLATELET # BLD AUTO: 350 10*3/MM3 (ref 140–450)
PMV BLD AUTO: 9.8 FL (ref 6–12)
POTASSIUM SERPL-SCNC: 4.3 MMOL/L (ref 3.5–5.2)
QT INTERVAL: 399 MS
RBC # BLD AUTO: 4.68 10*6/MM3 (ref 3.77–5.28)
SODIUM SERPL-SCNC: 136 MMOL/L (ref 136–145)
WBC NRBC COR # BLD: 8.52 10*3/MM3 (ref 3.4–10.8)

## 2023-02-20 PROCEDURE — 36415 COLL VENOUS BLD VENIPUNCTURE: CPT

## 2023-02-20 PROCEDURE — 80048 BASIC METABOLIC PNL TOTAL CA: CPT

## 2023-02-20 PROCEDURE — 85027 COMPLETE CBC AUTOMATED: CPT

## 2023-02-20 PROCEDURE — 93005 ELECTROCARDIOGRAM TRACING: CPT

## 2023-02-20 PROCEDURE — 93010 ELECTROCARDIOGRAM REPORT: CPT | Performed by: STUDENT IN AN ORGANIZED HEALTH CARE EDUCATION/TRAINING PROGRAM

## 2023-02-20 RX ORDER — FLUTICASONE PROPIONATE 50 MCG
2 SPRAY, SUSPENSION (ML) NASAL DAILY PRN
COMMUNITY
Start: 2023-02-15

## 2023-02-20 RX ORDER — DULOXETIN HYDROCHLORIDE 60 MG/1
60 CAPSULE, DELAYED RELEASE ORAL 2 TIMES DAILY
COMMUNITY

## 2023-02-20 RX ORDER — SUMATRIPTAN 100 MG/1
100 TABLET, FILM COATED ORAL
COMMUNITY

## 2023-02-20 NOTE — DISCHARGE INSTRUCTIONS
Take the following medications the morning of surgery: LEVOTHYROXINE, PRILOSEC, CYMBALTA, OXYCODONE      If you are on prescription narcotic pain medication to control your pain you may also take that medication the morning of surgery.    General Instructions:  Do not eat solid food after midnight the night before surgery.  You may drink clear liquids day of surgery but must stop at least one hour before your hospital arrival time.  It is beneficial for you to have a clear drink that contains carbohydrates the day of surgery.  We suggest a 12 to 20 ounce bottle of Gatorade or Powerade for non-diabetic patients or a 12 to 20 ounce bottle of G2 or Powerade Zero for diabetic patients.     Clear liquids are liquids you can see through.  Nothing red in color.     Plain water                               Sports drinks  Sodas                                   Gelatin (Jell-O)  Fruit juices without pulp such as white grape juice and apple juice  Popsicles that contain no fruit or yogurt  Tea or coffee (no cream or milk added)  Gatorade / Powerade  G2 / Powerade Zero    Bring any papers given to you in the doctor’s office.  Wear clean comfortable clothes.  Do not wear contact lenses, false eyelashes or make-up.  Bring a case for your glasses.   Remove all piercings.  Leave jewelry and any other valuables at home.  The Pre-Admission Testing nurse will instruct you to bring medications if unable to obtain an accurate list in Pre-Admission Testing.            Preventing a Surgical Site Infection:  For 2 to 3 days before surgery, avoid shaving with a razor because the razor can irritate skin and make it easier to develop an infection.    Any areas of open skin can increase the risk of a post-operative wound infection by allowing bacteria to enter and travel throughout the body.  Notify your surgeon if you have any skin wounds / rashes even if it is not near the expected surgical site.  The area will need assessed to determine  if surgery should be delayed until it is healed.  The night prior to surgery shower using a fresh bar of anti-bacterial soap (such as Dial) and clean washcloth.  Sleep in a clean bed with clean clothing.  Do not allow pets to sleep with you.  Shower on the morning of surgery using a fresh bar of anti-bacterial soap (such as Dial) and clean washcloth.  Dry with a clean towel and dress in clean clothing.  Ask your surgeon if you will be receiving antibiotics prior to surgery.  Make sure you, your family, and all healthcare providers clean their hands with soap and water or an alcohol based hand  before caring for you or your wound.    Day of surgery:  Your arrival time is approximately two hours before your scheduled surgery time.  Upon arrival, a Pre-op nurse and Anesthesiologist will review your health history, obtain vital signs, and answer questions you may have.  The only belongings needed at this time will be a list of your home medications and if applicable your C-PAP/BI-PAP machine.  A Pre-op nurse will start an IV and you may receive medication in preparation for surgery, including something to help you relax.     Please be aware that surgery does come with discomfort.  We want to make every effort to control your discomfort so please discuss any uncontrolled symptoms with your nurse.   Your doctor will most likely have prescribed pain medications.      If you are going home after surgery you will receive individualized written care instructions before being discharged.  A responsible adult must drive you to and from the hospital on the day of your surgery and stay with you for 24 hours.  Discharge prescriptions can be filled by the hospital pharmacy during regular pharmacy hours.  If you are having surgery late in the day/evening your prescription may be e-prescribed to your pharmacy.  Please verify your pharmacy hours or chose a 24 hour pharmacy to avoid not having access to your prescription  because your pharmacy has closed for the day.    If you are staying overnight following surgery, you will be transported to your hospital room following the recovery period.  [facility] has all private rooms.    If you have any questions please call Pre-Admission Testing at [phone].  Deductibles and co-payments are collected on the day of service. Please be prepared to pay the required co-pay, deductible or deposit on the day of service as defined by your plan.    CHLORHEXIDINE CLOTH INSTRUCTIONS  The morning of surgery follow these instructions using the Chlorhexidine cloths you've been given.  These steps reduce bacteria on the body.  Do not use the cloths near your eyes, ears mouth, genitalia or on open wounds.  Throw the cloths away after use but do not try to flush them down a toilet.      Open and remove one cloth at a time from the package.    Leave the cloth unfolded and begin the bathing.  Massage the skin with the cloths using gentle pressure to remove bacteria.  Do not scrub harshly.   Follow the steps below with one 2% CHG cloth per area (6 total cloths).  One cloth for neck, shoulders and chest.  One cloth for both arms, hands, fingers and underarms (do underarms last).  One cloth for the abdomen followed by groin.  One cloth for right leg and foot including between the toes.  One cloth for left leg and foot including between the toes.  The last cloth is to be used for the back of the neck, back and buttocks.    Allow the CHG to air dry 3 minutes on the skin which will give it time to work and decrease the chance of irritation.  The skin may feel sticky until it is dry.  Do not rinse with water or any other liquid or you will lose the beneficial effects of the CHG.  If mild skin irritation occurs, do rinse the skin to remove the CHG.  Report this to the nurse at time of admission.  Do not apply lotions, creams, ointments, deodorants or perfumes after using the clothes. Dress in  clean clothes before coming to the hospital.     Call your surgeon immediately if you experience any of the following symptoms:  Sore Throat  Shortness of Breath or difficulty breathing  Cough  Chills  Body soreness or muscle pain  Headache  Fever  New loss of taste or smell  Do not arrive for your surgery ill.  Your procedure will need to be rescheduled to another time.  You will need to call your physician before the day of surgery to avoid any unnecessary exposure to hospital staff as well as other patients.

## 2023-02-23 RX ORDER — MONTELUKAST SODIUM 4 MG/1
TABLET, CHEWABLE ORAL
Qty: 60 TABLET | Refills: 0 | Status: SHIPPED | OUTPATIENT
Start: 2023-02-23

## 2023-03-02 ENCOUNTER — HOSPITAL ENCOUNTER (OUTPATIENT)
Facility: HOSPITAL | Age: 68
Setting detail: HOSPITAL OUTPATIENT SURGERY
Discharge: HOME | End: 2023-03-02
Attending: COLON & RECTAL SURGERY | Admitting: COLON & RECTAL SURGERY
Payer: MEDICARE

## 2023-03-02 ENCOUNTER — ANESTHESIA (OUTPATIENT)
Dept: PERIOP | Facility: HOSPITAL | Age: 68
End: 2023-03-02
Payer: MEDICARE

## 2023-03-02 ENCOUNTER — APPOINTMENT (OUTPATIENT)
Dept: GENERAL RADIOLOGY | Facility: HOSPITAL | Age: 68
End: 2023-03-02
Payer: MEDICARE

## 2023-03-02 ENCOUNTER — ANESTHESIA EVENT (OUTPATIENT)
Dept: PERIOP | Facility: HOSPITAL | Age: 68
End: 2023-03-02
Payer: MEDICARE

## 2023-03-02 VITALS
SYSTOLIC BLOOD PRESSURE: 111 MMHG | BODY MASS INDEX: 30.55 KG/M2 | OXYGEN SATURATION: 96 % | WEIGHT: 166.01 LBS | DIASTOLIC BLOOD PRESSURE: 57 MMHG | HEART RATE: 73 BPM | RESPIRATION RATE: 16 BRPM | HEIGHT: 62 IN | TEMPERATURE: 97.5 F

## 2023-03-02 DIAGNOSIS — R15.9 INCONTINENCE OF FECES, UNSPECIFIED FECAL INCONTINENCE TYPE: ICD-10-CM

## 2023-03-02 PROCEDURE — 25010000002 DEXAMETHASONE SODIUM PHOSPHATE 20 MG/5ML SOLUTION: Performed by: NURSE ANESTHETIST, CERTIFIED REGISTERED

## 2023-03-02 PROCEDURE — 25010000002 PROPOFOL 10 MG/ML EMULSION: Performed by: NURSE ANESTHETIST, CERTIFIED REGISTERED

## 2023-03-02 PROCEDURE — C1778 LEAD, NEUROSTIMULATOR: HCPCS | Performed by: COLON & RECTAL SURGERY

## 2023-03-02 PROCEDURE — 76000 FLUOROSCOPY <1 HR PHYS/QHP: CPT

## 2023-03-02 PROCEDURE — C1883 ADAPT/EXT, PACING/NEURO LEAD: HCPCS | Performed by: COLON & RECTAL SURGERY

## 2023-03-02 PROCEDURE — 25010000002 CEFAZOLIN IN DEXTROSE 2-4 GM/100ML-% SOLUTION: Performed by: COLON & RECTAL SURGERY

## 2023-03-02 PROCEDURE — 25010000002 MIDAZOLAM PER 1 MG: Performed by: ANESTHESIOLOGY

## 2023-03-02 PROCEDURE — 25010000002 ONDANSETRON PER 1 MG: Performed by: NURSE ANESTHETIST, CERTIFIED REGISTERED

## 2023-03-02 PROCEDURE — 64561 IMPLANT NEUROELECTRODES: CPT | Performed by: COLON & RECTAL SURGERY

## 2023-03-02 PROCEDURE — 72220 X-RAY EXAM SACRUM TAILBONE: CPT

## 2023-03-02 PROCEDURE — C1787 PATIENT PROGR, NEUROSTIM: HCPCS | Performed by: COLON & RECTAL SURGERY

## 2023-03-02 DEVICE — KT LD NEUROSTM INTERSTIM SURESCAN FULLBDY 4.32MM: Type: IMPLANTABLE DEVICE | Site: BACK | Status: FUNCTIONAL

## 2023-03-02 DEVICE — CABL EXT LD/NEUROSTM INTERSTIM SURESCAN MRI: Type: IMPLANTABLE DEVICE | Site: BACK | Status: FUNCTIONAL

## 2023-03-02 RX ORDER — SODIUM CHLORIDE, SODIUM LACTATE, POTASSIUM CHLORIDE, CALCIUM CHLORIDE 600; 310; 30; 20 MG/100ML; MG/100ML; MG/100ML; MG/100ML
9 INJECTION, SOLUTION INTRAVENOUS CONTINUOUS
Status: DISCONTINUED | OUTPATIENT
Start: 2023-03-02 | End: 2023-03-02 | Stop reason: HOSPADM

## 2023-03-02 RX ORDER — IBUPROFEN 600 MG/1
600 TABLET ORAL EVERY 6 HOURS PRN
Status: DISCONTINUED | OUTPATIENT
Start: 2023-03-02 | End: 2023-03-02 | Stop reason: HOSPADM

## 2023-03-02 RX ORDER — PROMETHAZINE HYDROCHLORIDE 25 MG/1
12.5 TABLET ORAL ONCE AS NEEDED
Status: DISCONTINUED | OUTPATIENT
Start: 2023-03-02 | End: 2023-03-02 | Stop reason: HOSPADM

## 2023-03-02 RX ORDER — HYDROMORPHONE HYDROCHLORIDE 1 MG/ML
0.5 INJECTION, SOLUTION INTRAMUSCULAR; INTRAVENOUS; SUBCUTANEOUS
Status: DISCONTINUED | OUTPATIENT
Start: 2023-03-02 | End: 2023-03-02 | Stop reason: HOSPADM

## 2023-03-02 RX ORDER — MAGNESIUM HYDROXIDE 1200 MG/15ML
LIQUID ORAL AS NEEDED
Status: DISCONTINUED | OUTPATIENT
Start: 2023-03-02 | End: 2023-03-02 | Stop reason: HOSPADM

## 2023-03-02 RX ORDER — EPHEDRINE SULFATE 50 MG/ML
5 INJECTION, SOLUTION INTRAVENOUS ONCE AS NEEDED
Status: DISCONTINUED | OUTPATIENT
Start: 2023-03-02 | End: 2023-03-02 | Stop reason: HOSPADM

## 2023-03-02 RX ORDER — ONDANSETRON 4 MG/1
4 TABLET, FILM COATED ORAL ONCE AS NEEDED
Status: DISCONTINUED | OUTPATIENT
Start: 2023-03-02 | End: 2023-03-02 | Stop reason: HOSPADM

## 2023-03-02 RX ORDER — FENTANYL CITRATE 50 UG/ML
50 INJECTION, SOLUTION INTRAMUSCULAR; INTRAVENOUS
Status: DISCONTINUED | OUTPATIENT
Start: 2023-03-02 | End: 2023-03-02 | Stop reason: HOSPADM

## 2023-03-02 RX ORDER — NEOMYCIN SULFATE, POLYMYXIN B SULFATE, AND DEXAMETHASONE 3.5; 10000; 1 MG/G; [USP'U]/G; MG/G
1 OINTMENT OPHTHALMIC 2 TIMES DAILY
COMMUNITY
Start: 2023-02-24

## 2023-03-02 RX ORDER — NALOXONE HCL 0.4 MG/ML
0.2 VIAL (ML) INJECTION AS NEEDED
Status: DISCONTINUED | OUTPATIENT
Start: 2023-03-02 | End: 2023-03-02 | Stop reason: HOSPADM

## 2023-03-02 RX ORDER — FAMOTIDINE 10 MG/ML
20 INJECTION, SOLUTION INTRAVENOUS ONCE
Status: COMPLETED | OUTPATIENT
Start: 2023-03-02 | End: 2023-03-02

## 2023-03-02 RX ORDER — ONDANSETRON 2 MG/ML
4 INJECTION INTRAMUSCULAR; INTRAVENOUS ONCE AS NEEDED
Status: DISCONTINUED | OUTPATIENT
Start: 2023-03-02 | End: 2023-03-02 | Stop reason: HOSPADM

## 2023-03-02 RX ORDER — OXYCODONE AND ACETAMINOPHEN 7.5; 325 MG/1; MG/1
1 TABLET ORAL EVERY 4 HOURS PRN
Status: DISCONTINUED | OUTPATIENT
Start: 2023-03-02 | End: 2023-03-02 | Stop reason: HOSPADM

## 2023-03-02 RX ORDER — MIDAZOLAM HYDROCHLORIDE 1 MG/ML
0.5 INJECTION INTRAMUSCULAR; INTRAVENOUS
Status: DISCONTINUED | OUTPATIENT
Start: 2023-03-02 | End: 2023-03-02 | Stop reason: HOSPADM

## 2023-03-02 RX ORDER — DROPERIDOL 2.5 MG/ML
0.62 INJECTION, SOLUTION INTRAMUSCULAR; INTRAVENOUS
Status: DISCONTINUED | OUTPATIENT
Start: 2023-03-02 | End: 2023-03-02 | Stop reason: HOSPADM

## 2023-03-02 RX ORDER — LABETALOL HYDROCHLORIDE 5 MG/ML
5 INJECTION, SOLUTION INTRAVENOUS
Status: DISCONTINUED | OUTPATIENT
Start: 2023-03-02 | End: 2023-03-02 | Stop reason: HOSPADM

## 2023-03-02 RX ORDER — LIDOCAINE HYDROCHLORIDE 10 MG/ML
0.5 INJECTION, SOLUTION EPIDURAL; INFILTRATION; INTRACAUDAL; PERINEURAL ONCE AS NEEDED
Status: DISCONTINUED | OUTPATIENT
Start: 2023-03-02 | End: 2023-03-02 | Stop reason: HOSPADM

## 2023-03-02 RX ORDER — LIDOCAINE HYDROCHLORIDE 20 MG/ML
INJECTION, SOLUTION INFILTRATION; PERINEURAL AS NEEDED
Status: DISCONTINUED | OUTPATIENT
Start: 2023-03-02 | End: 2023-03-02 | Stop reason: SURG

## 2023-03-02 RX ORDER — PROPOFOL 10 MG/ML
VIAL (ML) INTRAVENOUS AS NEEDED
Status: DISCONTINUED | OUTPATIENT
Start: 2023-03-02 | End: 2023-03-02

## 2023-03-02 RX ORDER — HYDRALAZINE HYDROCHLORIDE 20 MG/ML
5 INJECTION INTRAMUSCULAR; INTRAVENOUS
Status: DISCONTINUED | OUTPATIENT
Start: 2023-03-02 | End: 2023-03-02 | Stop reason: HOSPADM

## 2023-03-02 RX ORDER — CEFAZOLIN SODIUM 2 G/100ML
2 INJECTION, SOLUTION INTRAVENOUS ONCE
Status: COMPLETED | OUTPATIENT
Start: 2023-03-02 | End: 2023-03-02

## 2023-03-02 RX ORDER — FLUMAZENIL 0.1 MG/ML
0.2 INJECTION INTRAVENOUS AS NEEDED
Status: DISCONTINUED | OUTPATIENT
Start: 2023-03-02 | End: 2023-03-02 | Stop reason: HOSPADM

## 2023-03-02 RX ORDER — DEXAMETHASONE SODIUM PHOSPHATE 4 MG/ML
INJECTION, SOLUTION INTRA-ARTICULAR; INTRALESIONAL; INTRAMUSCULAR; INTRAVENOUS; SOFT TISSUE AS NEEDED
Status: DISCONTINUED | OUTPATIENT
Start: 2023-03-02 | End: 2023-03-02 | Stop reason: SURG

## 2023-03-02 RX ORDER — PROMETHAZINE HYDROCHLORIDE 25 MG/1
25 TABLET ORAL ONCE AS NEEDED
Status: DISCONTINUED | OUTPATIENT
Start: 2023-03-02 | End: 2023-03-02 | Stop reason: HOSPADM

## 2023-03-02 RX ORDER — PROPOFOL 10 MG/ML
VIAL (ML) INTRAVENOUS CONTINUOUS PRN
Status: DISCONTINUED | OUTPATIENT
Start: 2023-03-02 | End: 2023-03-02 | Stop reason: SURG

## 2023-03-02 RX ORDER — PROMETHAZINE HYDROCHLORIDE 25 MG/1
25 SUPPOSITORY RECTAL ONCE AS NEEDED
Status: DISCONTINUED | OUTPATIENT
Start: 2023-03-02 | End: 2023-03-02 | Stop reason: HOSPADM

## 2023-03-02 RX ORDER — IPRATROPIUM BROMIDE AND ALBUTEROL SULFATE 2.5; .5 MG/3ML; MG/3ML
3 SOLUTION RESPIRATORY (INHALATION) ONCE AS NEEDED
Status: DISCONTINUED | OUTPATIENT
Start: 2023-03-02 | End: 2023-03-02 | Stop reason: HOSPADM

## 2023-03-02 RX ORDER — SODIUM CHLORIDE 0.9 % (FLUSH) 0.9 %
3-10 SYRINGE (ML) INJECTION AS NEEDED
Status: DISCONTINUED | OUTPATIENT
Start: 2023-03-02 | End: 2023-03-02 | Stop reason: HOSPADM

## 2023-03-02 RX ORDER — DIPHENHYDRAMINE HYDROCHLORIDE 50 MG/ML
12.5 INJECTION INTRAMUSCULAR; INTRAVENOUS
Status: DISCONTINUED | OUTPATIENT
Start: 2023-03-02 | End: 2023-03-02 | Stop reason: HOSPADM

## 2023-03-02 RX ORDER — SODIUM CHLORIDE 0.9 % (FLUSH) 0.9 %
3 SYRINGE (ML) INJECTION EVERY 12 HOURS SCHEDULED
Status: DISCONTINUED | OUTPATIENT
Start: 2023-03-02 | End: 2023-03-02 | Stop reason: HOSPADM

## 2023-03-02 RX ORDER — ONDANSETRON 2 MG/ML
INJECTION INTRAMUSCULAR; INTRAVENOUS AS NEEDED
Status: DISCONTINUED | OUTPATIENT
Start: 2023-03-02 | End: 2023-03-02 | Stop reason: SURG

## 2023-03-02 RX ORDER — HYDROCODONE BITARTRATE AND ACETAMINOPHEN 7.5; 325 MG/1; MG/1
1 TABLET ORAL ONCE AS NEEDED
Status: DISCONTINUED | OUTPATIENT
Start: 2023-03-02 | End: 2023-03-02 | Stop reason: HOSPADM

## 2023-03-02 RX ADMIN — ONDANSETRON 4 MG: 2 INJECTION INTRAMUSCULAR; INTRAVENOUS at 08:14

## 2023-03-02 RX ADMIN — PROPOFOL 200 MCG/KG/MIN: 10 INJECTION, EMULSION INTRAVENOUS at 08:05

## 2023-03-02 RX ADMIN — LIDOCAINE HYDROCHLORIDE 50 MG: 20 INJECTION, SOLUTION INFILTRATION; PERINEURAL at 08:05

## 2023-03-02 RX ADMIN — FAMOTIDINE 20 MG: 10 INJECTION INTRAVENOUS at 07:07

## 2023-03-02 RX ADMIN — CEFAZOLIN SODIUM 2 G: 2 INJECTION, SOLUTION INTRAVENOUS at 07:56

## 2023-03-02 RX ADMIN — SODIUM CHLORIDE, POTASSIUM CHLORIDE, SODIUM LACTATE AND CALCIUM CHLORIDE 9 ML/HR: 600; 310; 30; 20 INJECTION, SOLUTION INTRAVENOUS at 06:14

## 2023-03-02 RX ADMIN — MIDAZOLAM 0.5 MG: 1 INJECTION INTRAMUSCULAR; INTRAVENOUS at 07:55

## 2023-03-02 RX ADMIN — DEXAMETHASONE SODIUM PHOSPHATE 8 MG: 4 INJECTION, SOLUTION INTRAMUSCULAR; INTRAVENOUS at 08:07

## 2023-03-02 NOTE — OP NOTE
Date: 03/02/23    Surgeon: Yesica Diaz MD    Surgical Assist : Minerva Gonzalez PA-C    PROCEDURE:   Sacral nerve stimulator, stage I, with fluoroscopic guidence    PREOPERATIVE DIAGNOSIS: Fecal incontinence.   POSTOPERATIVE DIAGNOSIS: Fecal incontinence.     MAC anesthesia.     Estimated Blood Loss: minimal    Specimens: none    INDICATIONS: This is a 67 y.o.  female with fecal incontinence.  She has failed conservative management and wishes to try SNS.  She understands the risks, benefits, and alternatives, and wishes to proceed.       Procedure: The patient was brought into the operating room, SCDs in place. Antibiotics had been infused. Patient was properly identified and placed in prone position per OR protocol.  Pillows were placed under lower abdomen to flatten the sacrum and under shins to allow the toes to dangle freely. A ground pad was placed on the bottom of the patient's foot and the proximal ends of the test stimulation cable were connected to the ground pad and the external neurostimulator (ENS). After adequate MAC anesthesia was achieved, the patient was prepped and draped in sterile fashion, 1% lidocaine with epinephrine, 1/4% Marcaine without was used as a local infiltration    The c-arm was moved into AP position to provide fluoroscopic guidance of the sacrum. The medial edges of the foramina were identified and marked. The c-arm was then moved into the lateral position to identify the S3 foramen on the right side. A foramen needle was placed in the superior, medial aspect of the S3 foramen and appropriate needle depth was visualized utilizing fluoroscopy.  Proper S3 needle location was also confirmed by direct observation of the lifting of the perineum or “bellowing,” and plantar flexion of the great toe utilizing the test stimulation cable, the ENS and .The foramen needle stylet was removed and a directional guide was placed through the needle using markers on the guide to assure  appropriate depth. The foramen needle was removed by sliding over the directional guide. A small incision was made peripherally to the directional guide through the skin. The lead introducer with dilator was placed over the directional guide and utilizing fluoroscopic guidance, the lead introducer was advanced until the radiopaque jessy was confirmed half-way through the foramen. The dilator was removed along with the directional guide. Using fluoroscopy, the tined lead with bent stylet was placed through the introducer until electrodes two and three straddled the anterior surface of the sacrum. All four electrodes were tested, observing “megan” and plantar flexion of the great toe utilizing the test stimulation cable and the ENS and enhanced Verify™ . After satisfactory lead positioning was confirmed, the introducer was retracted over the lead under continuous fluoroscopy, deploying the tines into presacral tissue. Retesting of all four electrodes confirming appropriate responses was completed.    The future internal neurostimulator pocket site was identified below the iliac crest and lateral to the sacrum. Local was administered and an incision was made into the subcutaneous tissue creating a connection on the left pocket site. Blunt dissection was used to create a small pocket with hemostasis achieved.A tunneling tool with sheath and dilator tip was placed from the lead insertion site subcutaneously to the small incised connection pocket site. The tunneling tool was removed and the lead was fed through the sheath, exiting at the connection pocket site. The sheath was removed. The percutaneous exit site was identified and an incision was made at the contralateral percutaneous extension site. The tunneling tool was reassembled with the dilator tip excluding the tunneling tube. The tunneling tool was inserted at the pocket connection site and tunneled to the percutaneous extension exit site. The dilator  tip was removed and replaced with the carrier tip. The connector end of the percutaneous extension was pressed fit into the carrier tip and the tunneling tool was pulled back to the connection pocket site. The connector end of the   percutaneous extension was detached and the tunneling tool and carrier tip was removed. The lead and connector end of the percutaneous extension were cleaned and dried. The lead was inserted into percutaneous extension connection hub. The setscrew was tightened with the torque wrench until an audible click was heard. The connection components were placed into the connection pocket site. The incisions and pocket were irrigated with sterile water and closed with  2.0 vicryl subcuticular sutures and 4.0 Monocryl skin sutures. Adhesive strips and gauze were placed over incisions and the percutaneous extension exit site and secured with transparent dressing. Gauze was placed under the percutaneous extension with a cable strain relief and secured using transparent dressing. The percutaneous extension was then plugged into the ENS and placed into the pouch on the wearable patient belt.  Instrument, lap, needle counts were all correct.  Patient was taken to recovery in stable condition.

## 2023-03-02 NOTE — H&P
"The patient has been having trouble with incontinence for over a year. She denies any changes in her stool consistency or diarrhea. The patient states it is embarrassing and she does not leave the house. She has taken too much Imodium and it did not help. The patient continues to experience leakage. It was not happening for a while, but now it is more frequent. The patient has a bowel movement every day. Her stool is \"not hard, but it is not running\" and it is \"soft and stays together\". She reports she eats fiber all the time. The patient continues to take colestipol and Movantik as prescribed by Dr. Ramsay. She is not constipated.      The patient reports experiencing bladder leakage and cannot hold her urine if she has to use the restroom or laughs. The patient states she had an \"electric box\" on her buttocks and it did not help. She reports doing the appropriate exercises for it     The patient has had 14 spine surgeries and 2 C-sections. She was in labor with her first child for a week and had a bowel movement inside her. The patient continues to take oxycodone, meloxicam, omeprazole. estradiol and thyroid and cholesterol medication.    The patient reports that she is doing well. She states that she is no longer taking the Movantik. She reports that it has helped a little. The patient reports that she is having an accident with her bowels all the time. She states that if she has a bowel movement in the morning, she has to deal with that all day long. The patient reports that she is wearing pads and changing pads. She states that she goes through 5 or 6 pads a day. The patient reports that she does not have to go to the bathroom that many times    She continues to have leakage almost daily.     Medical History        Past Medical History:   Diagnosis Date   • Abnormal bowel habits 01/02/2019   • Bladder incontinence 12/2021   • Cancer (HCC)     • Carpal tunnel syndrome     • Cervical radiculopathy 06/19/2019   • " Cervical spondylosis     • Chronic back pain     • Chronic narcotic use     • Distal radius fracture 10/15/2014   • Early satiety 2022   • Fecal smearing 2019   • GERD (gastroesophageal reflux disease)     • Hepatic steatosis 2022     FOUND ON MRI OF ABDOMEN   • Herpes simplex     • IBS (irritable bowel syndrome)     • Injury of posterior neck 2022     neck/back injury   • Injury to nerves 2022   • Lumbar radiculopathy     • Malunion of fracture 10/15/2014   • Migraine     • Osteopenia 2020   • Pelvic floor dysfunction     • Pseudarthrosis after fusion or arthrodesis 2018   • Renal cyst 2022     A FEW SMALL BILATERAL RENAL CYSTS, FOUND ON MRI OF ABDOMEN   • Scoliosis     • Spasmodic torticollis 2019   • Thoracic radiculopathy     • Thyroid disease              Surgical History     Past Surgical History:   Procedure Laterality Date   • ANORECTAL MANOMETRY N/A 2009   • BACK SURGERY N/A 10/30/2006    DR. GREGORIO RICHMOND AT [facility]   • BREAST AUGMENTATION Bilateral    • BREAST LUMPECTOMY Bilateral     BENIGN   • CARPAL TUNNEL RELEASE Right 2012    DR. JONATHON VERDIN AT [facility]   •  SECTION N/A    • CHOLECYSTECTOMY N/A    • COLONOSCOPY N/A     normal per patient   • COLONOSCOPY N/A 2019    RANDOM BIOPSIES BENIGN, GRADE 1 HEMORRHOIDS, DECREASED ANAL SPHINCTER TONE, RESCOPE IN 10 YRS, DR. ABRAHAM JONES AT [facility]   • ENDOSCOPY N/A 2014    MILD INFLAMMATION, OTHERWISE WNL, DR. TIMMY AIKEN AT [facility]   • HYSTERECTOMY N/A    • LUMBAR SPINE SURGERY      MULTIPLE   • LUMBAR SPINE SURGERY N/A 2003    DR. GREGORIO RICHMOND AT [facility]   • LUMBAR SPINE SURGERY N/A 2002    DR. GREGORIO RICHMOND AT [facility]   • LUMBAR SPINE SURGERY N/A 2001    DR. GREGORIO RICHMOND AT [facility]   • LUMBAR SPINE SURGERY N/A 2001    DR. GREGORIO RICHMOND AT [facility]   • ORIF WRIST FRACTURE Right 10/15/2014    DR. PERRY NICKERSON AT [facility]   • TONSILLECTOMY Bilateral    •  WISDOM TOOTH EXTRACTION Bilateral        Social History:   reports that she quit smoking about 26 years ago. Her smoking use included cigarettes. She started smoking about 50 years ago. She smoked an average of .5 packs per day. She has been exposed to tobacco smoke. She has never used smokeless tobacco. She reports current alcohol use of about 1.0 standard drink per week. She reports that she does not use drugs.        Marriage status:      History reviewed. No pertinent family history.        Current Outpatient Medications:   •  acyclovir (ZOVIRAX) 5 % cream, Zovirax 5 % topical cream, Disp: , Rfl:   •  amitriptyline (ELAVIL) 10 MG tablet, , Disp: , Rfl:   •  celecoxib (CeleBREX) 200 MG capsule, celecoxib 200 mg capsule, Disp: , Rfl:   •  colestipol (COLESTID) 1 g tablet, TAKE TWO TABLETS ONCE DAILY., Disp: 180 tablet, Rfl: 1  •  diclofenac (VOLTAREN) 1 % gel gel, Voltaren 1 % topical gel, Disp: , Rfl:   •  estradiol (ESTRACE) 2 MG tablet, estradiol 2 mg tablet, Disp: , Rfl:   •  famotidine (Pepcid) 40 MG tablet, Take 1 tablet by mouth Daily., Disp: 30 tablet, Rfl: 5  •  FeroSul 325 (65 Fe) MG tablet, , Disp: , Rfl:   •  fluticasone (FLONASE) 50 MCG/ACT nasal spray, fluticasone propionate 50 mcg/actuation nasal spray,suspension  SHAKE LIQUID AND USE 2 SPRAYS IN EACH NOSTRIL DAILY, Disp: , Rfl:   •  gabapentin (NEURONTIN) 600 MG tablet, Take 600 mg by mouth Daily. Takes one tablet in the morning, one at lunch, and two at bedtime, Disp: , Rfl:   •  levothyroxine (SYNTHROID, LEVOTHROID) 88 MCG tablet, levothyroxine 88 mcg tablet, Disp: , Rfl:   •  lidocaine-prilocaine (EMLA) 2.5-2.5 % cream, APPLY 1-2 grams 3 TO 4 TIMES DAILY AS NEEDED, Disp: , Rfl: 5  •  meloxicam (MOBIC) 15 MG tablet, , Disp: , Rfl:   •  montelukast (SINGULAIR) 10 MG tablet, montelukast 10 mg tablet, Disp: , Rfl:   •  Movantik 25 MG tablet, TAKE ONE TABLET BY MOUTH EVERY MORNING., Disp: 30 tablet, Rfl: 11  •  omeprazole (priLOSEC) 40 MG  capsule, Take 1 capsule by mouth Daily., Disp: 90 capsule, Rfl: 3  •  ondansetron ODT (ZOFRAN-ODT) 8 MG disintegrating tablet, TAKE 1 TABLET EVERY 8 HOURS AS NEEDED FOR NAUSEA AND VOMITING, Disp: , Rfl: 1  •  oxyCODONE (ROXICODONE) 15 MG immediate release tablet, oxycodone 15 mg tablet, Disp: , Rfl:   •  oxyCODONE ER (oxyCONTIN) 40 MG 12 hr tablet, OxyContin 40 mg tablet,crush resistant,extended release, Disp: , Rfl:   •  valACYclovir (VALTREX) 1000 MG tablet, valacyclovir 1 gram tablet, Disp: , Rfl:   •  clindamycin (CLEOCIN) 300 MG capsule, clindamycin HCl 300 mg capsule, Disp: , Rfl:      Allergy  Adhesive tape and Latex     Review of Systems   Constitutional: Negative for decreased appetite and weight gain.   HENT: Negative for congestion, hearing loss and hoarse voice.    Eyes: Negative for blurred vision, discharge and visual disturbance.   Cardiovascular: Negative for chest pain, cyanosis and leg swelling.   Respiratory: Negative for cough, shortness of breath, sleep disturbances due to breathing and snoring.    Endocrine: Positive for cold intolerance. Negative for heat intolerance.   Hematologic/Lymphatic: Bruises/bleeds easily.   Skin: Negative for itching, poor wound healing and skin cancer.   Musculoskeletal: Negative for arthritis, back pain, joint pain and joint swelling.   Gastrointestinal: Negative for abdominal pain, change in bowel habit, bowel incontinence and constipation.   Genitourinary: Negative for bladder incontinence, dysuria and hematuria.   Neurological: Positive for loss of balance. Negative for brief paralysis, excessive daytime sleepiness, dizziness, focal weakness, headaches, light-headedness and weakness.   Psychiatric/Behavioral: Negative for altered mental status and hallucinations. The patient does not have insomnia.    Allergic/Immunologic: Positive for environmental allergies. Negative for HIV exposure and persistent infections.   All other systems reviewed and are  negative.            Vitals:     11/18/22 1338   BP: 108/68   Pulse: 82   Temp: 97.3 °F (36.3 °C)   SpO2: 100%      Body mass index is 30.36 kg/m².     Physical Exam  Exam conducted with a chaperone present.   Constitutional:       General: She is not in acute distress.     Appearance: She is well-developed.   HENT:      Head: Normocephalic and atraumatic.      Nose: Nose normal.   Eyes:      Conjunctiva/sclera: Conjunctivae normal.      Pupils: Pupils are equal, round, and reactive to light.   Neck:      Trachea: No tracheal deviation.   Pulmonary:      Effort: Pulmonary effort is normal. No respiratory distress.      Breath sounds: Normal breath sounds.   Abdominal:      General: Bowel sounds are normal. There is no distension.      Palpations: Abdomen is soft.   Genitourinary:     Comments: Perianal skin: Within normal limits.  Digital rectal exam: Significant decreased tone. No masses felt.  Musculoskeletal:         General: No deformity. Normal range of motion.      Cervical back: Normal range of motion.   Skin:     General: Skin is warm and dry.   Neurological:      Mental Status: She is alert and oriented to person, place, and time.      Cranial Nerves: No cranial nerve deficit.      Coordination: Coordination normal.      Gait: Gait normal.   Psychiatric:         Behavior: Behavior normal.         Judgment: Judgment normal.            Review of Medical Record:  The patient sees Dr. Ramsay. The patient has had incontinence. She saw SILVIA Marin on 12/27/2021 and was recommended to do manometry. She had an MRI which revealed pelvic floor laxity, no evidence of a cystocele, rectocele, or cul-de-sac and abnormality. Colonoscopy done 01/22/2019 was negative. The patient has been started on colestipol 08/24/2022. She has also had a prescription for clindamycin. The patient is on oxycodone     Assessment:  1. Incontinence of feces, unspecified fecal incontinence type          Plan:   I recommend doing a  sacral nerve stimulator trial.  We discussed the hope is that she gets 50% improvement.I explained to the patient that the nerve stimulator can not fix everything, so it is not 100 percent. The goal is to help at least decrease the amount of leakage episodes by 50 percent. It helps some of the urine symptoms too. We do a test week, so we put the nerve stimulator in and she will wear a battery pack for a week on the outside. We have to keep that record for that week and compare it to what the record that she has before and see if it has helped 50 percent of the time. If it has, then we do the put the battery on the inside and it lasts for somewhere around 10 years, so we check it intermittently to make sure that it is still working.  I explained to the patient that an incision is made, it is about 3 inches long right below her belt line. We do it in the operating room, so it is completely sterile. The chance of getting an infection in the lead or in the pocket is less than 2 percent

## 2023-03-02 NOTE — ADDENDUM NOTE
Addendum  created 03/02/23 1016 by Jackie Josue CRNA    Intraprocedure Event edited, Intraprocedure Staff edited

## 2023-03-02 NOTE — ANESTHESIA POSTPROCEDURE EVALUATION
Patient: Fanny Perera    Procedure Summary     Date: 03/02/23 Room / Location: [facility] OR  / [facility] MAIN OR    Anesthesia Start: 0759 Anesthesia Stop: 0916    Procedure: INTERSTIM STAGE 1 LEAD PLACEMENT WITH CINDI Diagnosis:       Incontinence of feces, unspecified fecal incontinence type      (Incontinence of feces, unspecified fecal incontinence type [R15.9])    Surgeons: Yesica Diaz MD Provider: Michele Garces MD    Anesthesia Type: MAC ASA Status: 3          Anesthesia Type: MAC    Vitals  Vitals Value Taken Time   BP 95/52 03/02/23 0930   Temp 36.4 °C (97.5 °F) 03/02/23 0913   Pulse 72 03/02/23 0933   Resp 16 03/02/23 0915   SpO2 96 % 03/02/23 0933   Vitals shown include unvalidated device data.        Post Anesthesia Care and Evaluation    Patient location during evaluation: PACU  Patient participation: complete - patient participated  Level of consciousness: awake and alert  Pain management: adequate    Airway patency: patent  Anesthetic complications: No anesthetic complications    Cardiovascular status: acceptable  Respiratory status: acceptable  Hydration status: acceptable    Comments: --------------------            03/02/23 0915     --------------------   BP:       114/67     Pulse:      84       Resp:       16       Temp:                SpO2:      96%      --------------------

## 2023-03-02 NOTE — ANESTHESIA PREPROCEDURE EVALUATION
Anesthesia Evaluation     Patient summary reviewed and Nursing notes reviewed   history of anesthetic complications: prolonged sedation               Airway   Mallampati: II  TM distance: >3 FB  Neck ROM: limited  Dental      Pulmonary    (+) a smoker Former,   Cardiovascular - negative cardio ROS    ECG reviewed  Rhythm: regular  Rate: normal        Neuro/Psych  (+) headaches, numbness,    GI/Hepatic/Renal/Endo    (+) obesity,  GERD,  liver disease fatty liver disease, thyroid problem hypothyroidism    Musculoskeletal     (+) neck pain,   Abdominal    Substance History - negative use     OB/GYN negative ob/gyn ROS         Other   arthritis,                      Anesthesia Plan    ASA 3     MAC     (MAC vs GA with LMA vs GETA if prone position    Multiple back surgeries    2 hour drive home for she and her  [place]    I have reviewed the patient's history with the patient and the chart, including all pertinent laboratory results and imaging. I have explained the risks of anesthesia including but not limited to dental damage, corneal abrasion, nerve injury, MI, stroke, and death. Questions asked and answered. Anesthetic plan discussed with patient and team as indicated. Patient expressed understanding of the above.  )  intravenous induction     Anesthetic plan, risks, benefits, and alternatives have been provided, discussed and informed consent has been obtained with: patient.        CODE STATUS:

## 2023-03-02 NOTE — H&P (VIEW-ONLY)
"The patient has been having trouble with incontinence for over a year. She denies any changes in her stool consistency or diarrhea. The patient states it is embarrassing and she does not leave the house. She has taken too much Imodium and it did not help. The patient continues to experience leakage. It was not happening for a while, but now it is more frequent. The patient has a bowel movement every day. Her stool is \"not hard, but it is not running\" and it is \"soft and stays together\". She reports she eats fiber all the time. The patient continues to take colestipol and Movantik as prescribed by Dr. Ramsay. She is not constipated.      The patient reports experiencing bladder leakage and cannot hold her urine if she has to use the restroom or laughs. The patient states she had an \"electric box\" on her buttocks and it did not help. She reports doing the appropriate exercises for it     The patient has had 14 spine surgeries and 2 C-sections. She was in labor with her first child for a week and had a bowel movement inside her. The patient continues to take oxycodone, meloxicam, omeprazole. estradiol and thyroid and cholesterol medication.    The patient reports that she is doing well. She states that she is no longer taking the Movantik. She reports that it has helped a little. The patient reports that she is having an accident with her bowels all the time. She states that if she has a bowel movement in the morning, she has to deal with that all day long. The patient reports that she is wearing pads and changing pads. She states that she goes through 5 or 6 pads a day. The patient reports that she does not have to go to the bathroom that many times    She continues to have leakage almost daily.     Medical History        Past Medical History:   Diagnosis Date   • Abnormal bowel habits 01/02/2019   • Bladder incontinence 12/2021   • Cancer (HCC)     • Carpal tunnel syndrome     • Cervical radiculopathy 06/19/2019   • " Cervical spondylosis     • Chronic back pain     • Chronic narcotic use     • Distal radius fracture 10/15/2014   • Early satiety 2022   • Fecal smearing 2019   • GERD (gastroesophageal reflux disease)     • Hepatic steatosis 2022     FOUND ON MRI OF ABDOMEN   • Herpes simplex     • IBS (irritable bowel syndrome)     • Injury of posterior neck 2022     neck/back injury   • Injury to nerves 2022   • Lumbar radiculopathy     • Malunion of fracture 10/15/2014   • Migraine     • Osteopenia 2020   • Pelvic floor dysfunction     • Pseudarthrosis after fusion or arthrodesis 2018   • Renal cyst 2022     A FEW SMALL BILATERAL RENAL CYSTS, FOUND ON MRI OF ABDOMEN   • Scoliosis     • Spasmodic torticollis 2019   • Thoracic radiculopathy     • Thyroid disease              Surgical History     Past Surgical History:   Procedure Laterality Date   • ANORECTAL MANOMETRY N/A 2009   • BACK SURGERY N/A 10/30/2006    DR. GREGORIO RICHMOND AT [facility]   • BREAST AUGMENTATION Bilateral    • BREAST LUMPECTOMY Bilateral     BENIGN   • CARPAL TUNNEL RELEASE Right 2012    DR. JONATHON VERDIN AT [facility]   •  SECTION N/A    • CHOLECYSTECTOMY N/A    • COLONOSCOPY N/A     normal per patient   • COLONOSCOPY N/A 2019    RANDOM BIOPSIES BENIGN, GRADE 1 HEMORRHOIDS, DECREASED ANAL SPHINCTER TONE, RESCOPE IN 10 YRS, DR. ABRAHAM JONES AT [facility]   • ENDOSCOPY N/A 2014    MILD INFLAMMATION, OTHERWISE WNL, DR. TIMMY AIKEN AT [facility]   • HYSTERECTOMY N/A    • LUMBAR SPINE SURGERY      MULTIPLE   • LUMBAR SPINE SURGERY N/A 2003    DR. GREGORIO RICHMOND AT [facility]   • LUMBAR SPINE SURGERY N/A 2002    DR. GREGORIO RICHMOND AT [facility]   • LUMBAR SPINE SURGERY N/A 2001    DR. GREGORIO RICHMOND AT [facility]   • LUMBAR SPINE SURGERY N/A 2001    DR. GREGORIO RICHMOND AT [facility]   • ORIF WRIST FRACTURE Right 10/15/2014    DR. PERRY NICKERSON AT [facility]   • TONSILLECTOMY Bilateral    •  WISDOM TOOTH EXTRACTION Bilateral        Social History:   reports that she quit smoking about 26 years ago. Her smoking use included cigarettes. She started smoking about 50 years ago. She smoked an average of .5 packs per day. She has been exposed to tobacco smoke. She has never used smokeless tobacco. She reports current alcohol use of about 1.0 standard drink per week. She reports that she does not use drugs.        Marriage status:      History reviewed. No pertinent family history.        Current Outpatient Medications:   •  acyclovir (ZOVIRAX) 5 % cream, Zovirax 5 % topical cream, Disp: , Rfl:   •  amitriptyline (ELAVIL) 10 MG tablet, , Disp: , Rfl:   •  celecoxib (CeleBREX) 200 MG capsule, celecoxib 200 mg capsule, Disp: , Rfl:   •  colestipol (COLESTID) 1 g tablet, TAKE TWO TABLETS ONCE DAILY., Disp: 180 tablet, Rfl: 1  •  diclofenac (VOLTAREN) 1 % gel gel, Voltaren 1 % topical gel, Disp: , Rfl:   •  estradiol (ESTRACE) 2 MG tablet, estradiol 2 mg tablet, Disp: , Rfl:   •  famotidine (Pepcid) 40 MG tablet, Take 1 tablet by mouth Daily., Disp: 30 tablet, Rfl: 5  •  FeroSul 325 (65 Fe) MG tablet, , Disp: , Rfl:   •  fluticasone (FLONASE) 50 MCG/ACT nasal spray, fluticasone propionate 50 mcg/actuation nasal spray,suspension  SHAKE LIQUID AND USE 2 SPRAYS IN EACH NOSTRIL DAILY, Disp: , Rfl:   •  gabapentin (NEURONTIN) 600 MG tablet, Take 600 mg by mouth Daily. Takes one tablet in the morning, one at lunch, and two at bedtime, Disp: , Rfl:   •  levothyroxine (SYNTHROID, LEVOTHROID) 88 MCG tablet, levothyroxine 88 mcg tablet, Disp: , Rfl:   •  lidocaine-prilocaine (EMLA) 2.5-2.5 % cream, APPLY 1-2 grams 3 TO 4 TIMES DAILY AS NEEDED, Disp: , Rfl: 5  •  meloxicam (MOBIC) 15 MG tablet, , Disp: , Rfl:   •  montelukast (SINGULAIR) 10 MG tablet, montelukast 10 mg tablet, Disp: , Rfl:   •  Movantik 25 MG tablet, TAKE ONE TABLET BY MOUTH EVERY MORNING., Disp: 30 tablet, Rfl: 11  •  omeprazole (priLOSEC) 40 MG  capsule, Take 1 capsule by mouth Daily., Disp: 90 capsule, Rfl: 3  •  ondansetron ODT (ZOFRAN-ODT) 8 MG disintegrating tablet, TAKE 1 TABLET EVERY 8 HOURS AS NEEDED FOR NAUSEA AND VOMITING, Disp: , Rfl: 1  •  oxyCODONE (ROXICODONE) 15 MG immediate release tablet, oxycodone 15 mg tablet, Disp: , Rfl:   •  oxyCODONE ER (oxyCONTIN) 40 MG 12 hr tablet, OxyContin 40 mg tablet,crush resistant,extended release, Disp: , Rfl:   •  valACYclovir (VALTREX) 1000 MG tablet, valacyclovir 1 gram tablet, Disp: , Rfl:   •  clindamycin (CLEOCIN) 300 MG capsule, clindamycin HCl 300 mg capsule, Disp: , Rfl:      Allergy  Adhesive tape and Latex     Review of Systems   Constitutional: Negative for decreased appetite and weight gain.   HENT: Negative for congestion, hearing loss and hoarse voice.    Eyes: Negative for blurred vision, discharge and visual disturbance.   Cardiovascular: Negative for chest pain, cyanosis and leg swelling.   Respiratory: Negative for cough, shortness of breath, sleep disturbances due to breathing and snoring.    Endocrine: Positive for cold intolerance. Negative for heat intolerance.   Hematologic/Lymphatic: Bruises/bleeds easily.   Skin: Negative for itching, poor wound healing and skin cancer.   Musculoskeletal: Negative for arthritis, back pain, joint pain and joint swelling.   Gastrointestinal: Negative for abdominal pain, change in bowel habit, bowel incontinence and constipation.   Genitourinary: Negative for bladder incontinence, dysuria and hematuria.   Neurological: Positive for loss of balance. Negative for brief paralysis, excessive daytime sleepiness, dizziness, focal weakness, headaches, light-headedness and weakness.   Psychiatric/Behavioral: Negative for altered mental status and hallucinations. The patient does not have insomnia.    Allergic/Immunologic: Positive for environmental allergies. Negative for HIV exposure and persistent infections.   All other systems reviewed and are  negative.            Vitals:     11/18/22 1338   BP: 108/68   Pulse: 82   Temp: 97.3 °F (36.3 °C)   SpO2: 100%      Body mass index is 30.36 kg/m².     Physical Exam  Exam conducted with a chaperone present.   Constitutional:       General: She is not in acute distress.     Appearance: She is well-developed.   HENT:      Head: Normocephalic and atraumatic.      Nose: Nose normal.   Eyes:      Conjunctiva/sclera: Conjunctivae normal.      Pupils: Pupils are equal, round, and reactive to light.   Neck:      Trachea: No tracheal deviation.   Pulmonary:      Effort: Pulmonary effort is normal. No respiratory distress.      Breath sounds: Normal breath sounds.   Abdominal:      General: Bowel sounds are normal. There is no distension.      Palpations: Abdomen is soft.   Genitourinary:     Comments: Perianal skin: Within normal limits.  Digital rectal exam: Significant decreased tone. No masses felt.  Musculoskeletal:         General: No deformity. Normal range of motion.      Cervical back: Normal range of motion.   Skin:     General: Skin is warm and dry.   Neurological:      Mental Status: She is alert and oriented to person, place, and time.      Cranial Nerves: No cranial nerve deficit.      Coordination: Coordination normal.      Gait: Gait normal.   Psychiatric:         Behavior: Behavior normal.         Judgment: Judgment normal.            Review of Medical Record:  The patient sees Dr. Ramsay. The patient has had incontinence. She saw SILVIA Marin on 12/27/2021 and was recommended to do manometry. She had an MRI which revealed pelvic floor laxity, no evidence of a cystocele, rectocele, or cul-de-sac and abnormality. Colonoscopy done 01/22/2019 was negative. The patient has been started on colestipol 08/24/2022. She has also had a prescription for clindamycin. The patient is on oxycodone     Assessment:  1. Incontinence of feces, unspecified fecal incontinence type          Plan:   I recommend doing a  sacral nerve stimulator trial.  We discussed the hope is that she gets 50% improvement.I explained to the patient that the nerve stimulator can not fix everything, so it is not 100 percent. The goal is to help at least decrease the amount of leakage episodes by 50 percent. It helps some of the urine symptoms too. We do a test week, so we put the nerve stimulator in and she will wear a battery pack for a week on the outside. We have to keep that record for that week and compare it to what the record that she has before and see if it has helped 50 percent of the time. If it has, then we do the put the battery on the inside and it lasts for somewhere around 10 years, so we check it intermittently to make sure that it is still working.  I explained to the patient that an incision is made, it is about 3 inches long right below her belt line. We do it in the operating room, so it is completely sterile. The chance of getting an infection in the lead or in the pocket is less than 2 percent

## 2023-03-09 ENCOUNTER — HOSPITAL ENCOUNTER (OUTPATIENT)
Facility: HOSPITAL | Age: 68
Setting detail: HOSPITAL OUTPATIENT SURGERY
Discharge: HOME | End: 2023-03-09
Attending: COLON & RECTAL SURGERY | Admitting: COLON & RECTAL SURGERY
Payer: MEDICARE

## 2023-03-09 ENCOUNTER — ANESTHESIA (OUTPATIENT)
Dept: PERIOP | Facility: HOSPITAL | Age: 68
End: 2023-03-09
Payer: MEDICARE

## 2023-03-09 ENCOUNTER — ANESTHESIA EVENT (OUTPATIENT)
Dept: PERIOP | Facility: HOSPITAL | Age: 68
End: 2023-03-09
Payer: MEDICARE

## 2023-03-09 VITALS
TEMPERATURE: 97.9 F | RESPIRATION RATE: 16 BRPM | HEART RATE: 92 BPM | SYSTOLIC BLOOD PRESSURE: 119 MMHG | OXYGEN SATURATION: 96 % | DIASTOLIC BLOOD PRESSURE: 68 MMHG

## 2023-03-09 DIAGNOSIS — R15.9 INCONTINENCE OF FECES, UNSPECIFIED FECAL INCONTINENCE TYPE: ICD-10-CM

## 2023-03-09 PROCEDURE — 25010000002 DEXAMETHASONE SODIUM PHOSPHATE 20 MG/5ML SOLUTION: Performed by: NURSE ANESTHETIST, CERTIFIED REGISTERED

## 2023-03-09 PROCEDURE — C1787 PATIENT PROGR, NEUROSTIM: HCPCS | Performed by: COLON & RECTAL SURGERY

## 2023-03-09 PROCEDURE — 25010000002 ONDANSETRON PER 1 MG: Performed by: NURSE ANESTHETIST, CERTIFIED REGISTERED

## 2023-03-09 PROCEDURE — 25010000002 CEFAZOLIN IN DEXTROSE 2-4 GM/100ML-% SOLUTION: Performed by: COLON & RECTAL SURGERY

## 2023-03-09 PROCEDURE — C1767 GENERATOR, NEURO NON-RECHARG: HCPCS | Performed by: COLON & RECTAL SURGERY

## 2023-03-09 PROCEDURE — 25010000002 PROPOFOL 10 MG/ML EMULSION: Performed by: NURSE ANESTHETIST, CERTIFIED REGISTERED

## 2023-03-09 PROCEDURE — 25010000002 FENTANYL CITRATE (PF) 50 MCG/ML SOLUTION: Performed by: NURSE ANESTHETIST, CERTIFIED REGISTERED

## 2023-03-09 PROCEDURE — 64590 INS/RPL PRPH SAC/GSTR NPG/R: CPT | Performed by: COLON & RECTAL SURGERY

## 2023-03-09 DEVICE — NEUROSTM SACRAL/NRV INTERSTIMX NONRECHG: Type: IMPLANTABLE DEVICE | Site: BUTTOCKS | Status: FUNCTIONAL

## 2023-03-09 RX ORDER — LIDOCAINE HYDROCHLORIDE 20 MG/ML
INJECTION, SOLUTION INFILTRATION; PERINEURAL AS NEEDED
Status: DISCONTINUED | OUTPATIENT
Start: 2023-03-09 | End: 2023-03-09 | Stop reason: SURG

## 2023-03-09 RX ORDER — SODIUM CHLORIDE, SODIUM LACTATE, POTASSIUM CHLORIDE, CALCIUM CHLORIDE 600; 310; 30; 20 MG/100ML; MG/100ML; MG/100ML; MG/100ML
9 INJECTION, SOLUTION INTRAVENOUS CONTINUOUS
Status: DISCONTINUED | OUTPATIENT
Start: 2023-03-09 | End: 2023-03-09 | Stop reason: HOSPADM

## 2023-03-09 RX ORDER — SODIUM CHLORIDE 9 MG/ML
40 INJECTION, SOLUTION INTRAVENOUS AS NEEDED
Status: DISCONTINUED | OUTPATIENT
Start: 2023-03-09 | End: 2023-03-09 | Stop reason: HOSPADM

## 2023-03-09 RX ORDER — EPHEDRINE SULFATE 50 MG/ML
5 INJECTION, SOLUTION INTRAVENOUS ONCE AS NEEDED
Status: DISCONTINUED | OUTPATIENT
Start: 2023-03-09 | End: 2023-03-09 | Stop reason: HOSPADM

## 2023-03-09 RX ORDER — FLUMAZENIL 0.1 MG/ML
0.2 INJECTION INTRAVENOUS AS NEEDED
Status: DISCONTINUED | OUTPATIENT
Start: 2023-03-09 | End: 2023-03-09 | Stop reason: HOSPADM

## 2023-03-09 RX ORDER — PROMETHAZINE HYDROCHLORIDE 25 MG/1
25 SUPPOSITORY RECTAL ONCE AS NEEDED
Status: DISCONTINUED | OUTPATIENT
Start: 2023-03-09 | End: 2023-03-09 | Stop reason: HOSPADM

## 2023-03-09 RX ORDER — ONDANSETRON 4 MG/1
4 TABLET, FILM COATED ORAL ONCE AS NEEDED
Status: DISCONTINUED | OUTPATIENT
Start: 2023-03-09 | End: 2023-03-09 | Stop reason: HOSPADM

## 2023-03-09 RX ORDER — FENTANYL CITRATE 50 UG/ML
50 INJECTION, SOLUTION INTRAMUSCULAR; INTRAVENOUS
Status: DISCONTINUED | OUTPATIENT
Start: 2023-03-09 | End: 2023-03-09 | Stop reason: HOSPADM

## 2023-03-09 RX ORDER — SODIUM CHLORIDE 0.9 % (FLUSH) 0.9 %
3 SYRINGE (ML) INJECTION EVERY 12 HOURS SCHEDULED
Status: DISCONTINUED | OUTPATIENT
Start: 2023-03-09 | End: 2023-03-09 | Stop reason: HOSPADM

## 2023-03-09 RX ORDER — LABETALOL HYDROCHLORIDE 5 MG/ML
5 INJECTION, SOLUTION INTRAVENOUS
Status: DISCONTINUED | OUTPATIENT
Start: 2023-03-09 | End: 2023-03-09 | Stop reason: HOSPADM

## 2023-03-09 RX ORDER — SODIUM CHLORIDE 0.9 % (FLUSH) 0.9 %
10 SYRINGE (ML) INJECTION AS NEEDED
Status: DISCONTINUED | OUTPATIENT
Start: 2023-03-09 | End: 2023-03-09 | Stop reason: HOSPADM

## 2023-03-09 RX ORDER — IPRATROPIUM BROMIDE AND ALBUTEROL SULFATE 2.5; .5 MG/3ML; MG/3ML
3 SOLUTION RESPIRATORY (INHALATION) ONCE AS NEEDED
Status: DISCONTINUED | OUTPATIENT
Start: 2023-03-09 | End: 2023-03-09 | Stop reason: HOSPADM

## 2023-03-09 RX ORDER — SODIUM CHLORIDE 0.9 % (FLUSH) 0.9 %
10 SYRINGE (ML) INJECTION EVERY 12 HOURS SCHEDULED
Status: DISCONTINUED | OUTPATIENT
Start: 2023-03-09 | End: 2023-03-09 | Stop reason: HOSPADM

## 2023-03-09 RX ORDER — DEXAMETHASONE SODIUM PHOSPHATE 4 MG/ML
INJECTION, SOLUTION INTRA-ARTICULAR; INTRALESIONAL; INTRAMUSCULAR; INTRAVENOUS; SOFT TISSUE AS NEEDED
Status: DISCONTINUED | OUTPATIENT
Start: 2023-03-09 | End: 2023-03-09 | Stop reason: SURG

## 2023-03-09 RX ORDER — NALOXONE HCL 0.4 MG/ML
0.2 VIAL (ML) INJECTION AS NEEDED
Status: DISCONTINUED | OUTPATIENT
Start: 2023-03-09 | End: 2023-03-09 | Stop reason: HOSPADM

## 2023-03-09 RX ORDER — CEFAZOLIN SODIUM 2 G/100ML
2 INJECTION, SOLUTION INTRAVENOUS ONCE
Status: COMPLETED | OUTPATIENT
Start: 2023-03-09 | End: 2023-03-09

## 2023-03-09 RX ORDER — DROPERIDOL 2.5 MG/ML
0.62 INJECTION, SOLUTION INTRAMUSCULAR; INTRAVENOUS
Status: DISCONTINUED | OUTPATIENT
Start: 2023-03-09 | End: 2023-03-09 | Stop reason: HOSPADM

## 2023-03-09 RX ORDER — PROMETHAZINE HYDROCHLORIDE 25 MG/1
12.5 TABLET ORAL ONCE AS NEEDED
Status: DISCONTINUED | OUTPATIENT
Start: 2023-03-09 | End: 2023-03-09 | Stop reason: HOSPADM

## 2023-03-09 RX ORDER — HYDROMORPHONE HYDROCHLORIDE 1 MG/ML
0.5 INJECTION, SOLUTION INTRAMUSCULAR; INTRAVENOUS; SUBCUTANEOUS
Status: DISCONTINUED | OUTPATIENT
Start: 2023-03-09 | End: 2023-03-09 | Stop reason: HOSPADM

## 2023-03-09 RX ORDER — GLYCOPYRROLATE 0.2 MG/ML
INJECTION INTRAMUSCULAR; INTRAVENOUS AS NEEDED
Status: DISCONTINUED | OUTPATIENT
Start: 2023-03-09 | End: 2023-03-09 | Stop reason: SURG

## 2023-03-09 RX ORDER — OXYCODONE HYDROCHLORIDE 5 MG/1
15 TABLET ORAL EVERY 4 HOURS PRN
Status: DISCONTINUED | OUTPATIENT
Start: 2023-03-09 | End: 2023-03-09 | Stop reason: HOSPADM

## 2023-03-09 RX ORDER — ONDANSETRON 2 MG/ML
4 INJECTION INTRAMUSCULAR; INTRAVENOUS ONCE AS NEEDED
Status: DISCONTINUED | OUTPATIENT
Start: 2023-03-09 | End: 2023-03-09 | Stop reason: HOSPADM

## 2023-03-09 RX ORDER — PROMETHAZINE HYDROCHLORIDE 25 MG/1
25 TABLET ORAL ONCE AS NEEDED
Status: DISCONTINUED | OUTPATIENT
Start: 2023-03-09 | End: 2023-03-09 | Stop reason: HOSPADM

## 2023-03-09 RX ORDER — MAGNESIUM HYDROXIDE 1200 MG/15ML
LIQUID ORAL AS NEEDED
Status: DISCONTINUED | OUTPATIENT
Start: 2023-03-09 | End: 2023-03-09 | Stop reason: HOSPADM

## 2023-03-09 RX ORDER — ONDANSETRON 2 MG/ML
INJECTION INTRAMUSCULAR; INTRAVENOUS AS NEEDED
Status: DISCONTINUED | OUTPATIENT
Start: 2023-03-09 | End: 2023-03-09 | Stop reason: SURG

## 2023-03-09 RX ORDER — SODIUM CHLORIDE 0.9 % (FLUSH) 0.9 %
3-10 SYRINGE (ML) INJECTION AS NEEDED
Status: DISCONTINUED | OUTPATIENT
Start: 2023-03-09 | End: 2023-03-09 | Stop reason: HOSPADM

## 2023-03-09 RX ORDER — MIDAZOLAM HYDROCHLORIDE 1 MG/ML
0.5 INJECTION INTRAMUSCULAR; INTRAVENOUS
Status: DISCONTINUED | OUTPATIENT
Start: 2023-03-09 | End: 2023-03-09 | Stop reason: HOSPADM

## 2023-03-09 RX ORDER — HYDRALAZINE HYDROCHLORIDE 20 MG/ML
5 INJECTION INTRAMUSCULAR; INTRAVENOUS
Status: DISCONTINUED | OUTPATIENT
Start: 2023-03-09 | End: 2023-03-09 | Stop reason: HOSPADM

## 2023-03-09 RX ORDER — DIPHENHYDRAMINE HYDROCHLORIDE 50 MG/ML
12.5 INJECTION INTRAMUSCULAR; INTRAVENOUS
Status: DISCONTINUED | OUTPATIENT
Start: 2023-03-09 | End: 2023-03-09 | Stop reason: HOSPADM

## 2023-03-09 RX ORDER — FAMOTIDINE 10 MG/ML
20 INJECTION, SOLUTION INTRAVENOUS ONCE
Status: COMPLETED | OUTPATIENT
Start: 2023-03-09 | End: 2023-03-09

## 2023-03-09 RX ORDER — LIDOCAINE HYDROCHLORIDE 10 MG/ML
0.5 INJECTION, SOLUTION EPIDURAL; INFILTRATION; INTRACAUDAL; PERINEURAL ONCE AS NEEDED
Status: DISCONTINUED | OUTPATIENT
Start: 2023-03-09 | End: 2023-03-09 | Stop reason: HOSPADM

## 2023-03-09 RX ORDER — FENTANYL CITRATE 50 UG/ML
INJECTION, SOLUTION INTRAMUSCULAR; INTRAVENOUS AS NEEDED
Status: DISCONTINUED | OUTPATIENT
Start: 2023-03-09 | End: 2023-03-09 | Stop reason: SURG

## 2023-03-09 RX ORDER — OXYCODONE HYDROCHLORIDE 5 MG/1
5 TABLET ORAL ONCE AS NEEDED
Status: DISCONTINUED | OUTPATIENT
Start: 2023-03-09 | End: 2023-03-09 | Stop reason: HOSPADM

## 2023-03-09 RX ORDER — PROPOFOL 10 MG/ML
VIAL (ML) INTRAVENOUS AS NEEDED
Status: DISCONTINUED | OUTPATIENT
Start: 2023-03-09 | End: 2023-03-09 | Stop reason: SURG

## 2023-03-09 RX ADMIN — CEFAZOLIN SODIUM 2 G: 2 INJECTION, SOLUTION INTRAVENOUS at 07:15

## 2023-03-09 RX ADMIN — GLYCOPYRROLATE 0.2 MG: 1 INJECTION INTRAMUSCULAR; INTRAVENOUS at 07:27

## 2023-03-09 RX ADMIN — LIDOCAINE HYDROCHLORIDE 80 MG: 20 INJECTION, SOLUTION INFILTRATION; PERINEURAL at 07:27

## 2023-03-09 RX ADMIN — SODIUM CHLORIDE, POTASSIUM CHLORIDE, SODIUM LACTATE AND CALCIUM CHLORIDE 9 ML/HR: 600; 310; 30; 20 INJECTION, SOLUTION INTRAVENOUS at 06:52

## 2023-03-09 RX ADMIN — ONDANSETRON 4 MG: 2 INJECTION INTRAMUSCULAR; INTRAVENOUS at 07:33

## 2023-03-09 RX ADMIN — FENTANYL CITRATE 50 MCG: 50 INJECTION, SOLUTION INTRAMUSCULAR; INTRAVENOUS at 07:27

## 2023-03-09 RX ADMIN — DEXAMETHASONE SODIUM PHOSPHATE 10 MG: 4 INJECTION, SOLUTION INTRAMUSCULAR; INTRAVENOUS at 07:33

## 2023-03-09 RX ADMIN — FAMOTIDINE 20 MG: 10 INJECTION INTRAVENOUS at 06:52

## 2023-03-09 RX ADMIN — PROPOFOL 150 MCG/KG/MIN: 10 INJECTION, EMULSION INTRAVENOUS at 07:28

## 2023-03-09 RX ADMIN — PROPOFOL 50 MG: 10 INJECTION, EMULSION INTRAVENOUS at 07:27

## 2023-03-09 NOTE — OP NOTE
Date: 03/09/23    Surgeon: Yesica Diaz MD    Surgical Assistant: Assistant: Jammie Molina PA-C    PROCEDURE:   Sacral nerve stimulator, stage II,    PREOPERATIVE DIAGNOSIS: Fecal incontinence.   POSTOPERATIVE DIAGNOSIS: Fecal incontinence.     MAC anesthesia.    Estimated Blood Loss: minimal    Specimens: none    INDICATIONS: This is a 67 y.o. year-old female who had positive stage I test and wishes to proceed with stage II. She understands the risks, benefits, and alternatives, and wishes to proceed.       DESCRIPTION OF PROCEDURE: The patient was brought into the operating room, SCDs in place and MAC infused. The patient was placed prone on the operating table. Timeout was done. Buttocks and lower back dressings were removed. She was prepped and draped in sterile fashion. Then 1% lidocaine with epinephrine and 0.25% Marcaine was used as a local infiltration. The previous incision, which was on the left side was opened and the lead/percutaneous connection was   identified and evaluated. The lead and percutaneous connector were exposed and the set screw was loosened   using the torque wrench. The percutaneous extension was disconnected from the lead. The percutaneous   extension was cut below the plastic and was removed from the field, at the exit site, ensuring sterility. The   connection hub was discarded. The percutaneous exit site was irrigated with antibiotic solution in sterile water and   closed with 4-0 Monocryl skin sutures.       The lead was cleaned and dried. The lead was inserted into the header of the InterStim™ II recharge-free neurostimulator. The   single set screw was tightened using the torque wrench until audible click was heard. The neurostimulator was   placed into the pocket and any excess lead was coiled behind the neurostimulator.  The communicator, covered in a sterile sleeve, was placed over the implanted neurostimulator to ensure proper   lead connection and that parameters were  within normal range.  Impedances were confirmed to be within normal limits, greater than 50 and less than 4,000 ohms.  The wound was irrigated with antibiotic solution in sterile water and closed with 2-0 Vicryl subcuticular sutures   and 4-0 Monocryl skin sutures.  All instrument, lap, needle counts were correct. Adhesive strips and gauze were placed over the incision and covered with transparent dressing.  Patient was taken to the recovery room in stable condition.

## 2023-03-09 NOTE — ANESTHESIA POSTPROCEDURE EVALUATION
Patient: Fanny Perera    Procedure Summary     Date: 03/09/23 Room / Location: [facility] OR [address] [place] [address] MAIN OR    Anesthesia Start: 0721 Anesthesia Stop: 0815    Procedure: Sacral nerve stimulator stage 2 Diagnosis:       Incontinence of feces, unspecified fecal incontinence type      (Incontinence of feces, unspecified fecal incontinence type [R15.9])    Surgeons: Yesica Diaz MD Provider: Jamison Zhang MD    Anesthesia Type: MAC ASA Status: 2          Anesthesia Type: MAC    Vitals  Vitals Value Taken Time   /68 03/09/23 0815   Temp 36.6 °C (97.9 °F) 03/09/23 0813   Pulse 75 03/09/23 0820   Resp 16 03/09/23 0813   SpO2 96 % 03/09/23 0820   Vitals shown include unvalidated device data.        Post Anesthesia Care and Evaluation    Patient location during evaluation: PHASE II  Patient participation: complete - patient participated  Level of consciousness: awake and alert  Pain management: adequate    Airway patency: patent  Anesthetic complications: No anesthetic complications  PONV Status: none  Cardiovascular status: acceptable and hemodynamically stable  Respiratory status: acceptable, nonlabored ventilation and spontaneous ventilation  Hydration status: acceptable

## 2023-03-09 NOTE — INTERVAL H&P NOTE
H&P reviewed.  The patient was examined and patient had a successful test week.  We will proceed with implant

## 2023-03-09 NOTE — ANESTHESIA PREPROCEDURE EVALUATION
Anesthesia Evaluation     NPO Solid Status: > 8 hours             Airway   Mallampati: I  Dental      Pulmonary    (+) a smoker Former,   (-) asthma, sleep apnea    ROS comment: Negative patient screen for LOKI    Cardiovascular     (-) hypertension      Neuro/Psych  (+) headaches,    GI/Hepatic/Renal/Endo    (+)  GERD,  liver disease, thyroid problem hypothyroidism    Musculoskeletal     (+) neck pain,   Abdominal    Substance History       Comment: Chronic narcotic usage/tolerance     OB/GYN          Other   arthritis,                      Anesthesia Plan    ASA 2     MAC       Anesthetic plan, risks, benefits, and alternatives have been provided, discussed and informed consent has been obtained with: patient.        CODE STATUS:

## 2023-03-14 ENCOUNTER — TELEPHONE (OUTPATIENT)
Dept: SURGERY | Facility: CLINIC | Age: 68
End: 2023-03-14
Payer: MEDICARE

## 2023-03-14 NOTE — TELEPHONE ENCOUNTER
Pt wanted to know if stitches were dissolvable or if she needed to remove them. Explained to pt that stitches would dissolve.

## 2023-03-14 NOTE — TELEPHONE ENCOUNTER
Hub staff attempted to follow warm transfer process and was unsuccessful     Caller: EDDY CLARKE    Relationship to patient: SELF  Best call back number: [phone]    Patient is needing: PT HAS QUESTIONS ABOUT SUTURES FROM SX 3.9.23.

## 2023-03-20 RX ORDER — OMEPRAZOLE 40 MG/1
CAPSULE, DELAYED RELEASE ORAL
Qty: 90 CAPSULE | Refills: 0 | Status: SHIPPED | OUTPATIENT
Start: 2023-03-20

## 2023-04-06 ENCOUNTER — TELEPHONE (OUTPATIENT)
Dept: SURGERY | Facility: CLINIC | Age: 68
End: 2023-04-06
Payer: MEDICARE

## 2023-04-06 NOTE — TELEPHONE ENCOUNTER
Pt called, said she is s/p stimulator about 3-4 weeks. Ever since the stimulator implant, every single BM stinks very bad and not like before the procedure. She has not taken any new meds or changed her diet. Pt says she is a vegetarian with a lrge amount of fiber in her diet. She said Dr. Ramsay put her on colestipol, she wonders if she should continue taking it. She said she talked to Bill from [employer] and he adjusted the setting. She is taking imodium, but only once a day, if at all. She started a fiber supplement 2 days ago. Wants to know what could be making her BMs stink and if she should take colestipol.

## 2023-04-07 NOTE — TELEPHONE ENCOUNTER
Pt lives far away, denies fever, said she is willing to give this new setting more time to see if it will work. Should she continue colestipol?

## 2023-04-17 ENCOUNTER — OFFICE VISIT (OUTPATIENT)
Dept: SURGERY | Facility: CLINIC | Age: 68
End: 2023-04-17
Payer: MEDICARE

## 2023-04-17 VITALS
DIASTOLIC BLOOD PRESSURE: 80 MMHG | TEMPERATURE: 97.6 F | OXYGEN SATURATION: 96 % | SYSTOLIC BLOOD PRESSURE: 142 MMHG | HEART RATE: 82 BPM | BODY MASS INDEX: 31.1 KG/M2 | WEIGHT: 169 LBS | HEIGHT: 62 IN

## 2023-04-17 DIAGNOSIS — R15.9 INCONTINENCE OF FECES, UNSPECIFIED FECAL INCONTINENCE TYPE: ICD-10-CM

## 2023-04-17 DIAGNOSIS — R19.7 DIARRHEA OF PRESUMED INFECTIOUS ORIGIN: Primary | ICD-10-CM

## 2023-04-17 PROBLEM — Z79.890 HORMONE REPLACEMENT THERAPY: Status: ACTIVE | Noted: 2023-04-17

## 2023-04-17 PROBLEM — G89.4 CHRONIC PAIN DISORDER: Status: ACTIVE | Noted: 2023-04-17

## 2023-04-17 PROBLEM — Z98.1 HISTORY OF LUMBAR FUSION: Status: ACTIVE | Noted: 2023-04-17

## 2023-04-17 PROBLEM — K21.9 CHRONIC GERD: Status: ACTIVE | Noted: 2023-04-17

## 2023-04-17 PROBLEM — E03.9 HYPOTHYROIDISM: Status: ACTIVE | Noted: 2023-04-17

## 2023-04-17 PROCEDURE — 95971 ALYS SMPL SP/PN NPGT W/PRGRM: CPT | Performed by: PHYSICIAN ASSISTANT

## 2023-04-17 PROCEDURE — 99024 POSTOP FOLLOW-UP VISIT: CPT | Performed by: PHYSICIAN ASSISTANT

## 2023-04-17 PROCEDURE — 1159F MED LIST DOCD IN RCRD: CPT | Performed by: PHYSICIAN ASSISTANT

## 2023-04-17 PROCEDURE — 1160F RVW MEDS BY RX/DR IN RCRD: CPT | Performed by: PHYSICIAN ASSISTANT

## 2023-04-17 RX ORDER — LIDOCAINE 50 MG/G
OINTMENT TOPICAL
COMMUNITY
Start: 2023-04-14

## 2023-04-17 RX ORDER — HYDROCODONE POLISTIREX AND CHLORPHENIRAMINE POLISTIREX 10; 8 MG/5ML; MG/5ML
SUSPENSION, EXTENDED RELEASE ORAL
COMMUNITY
Start: 2023-04-12

## 2023-04-17 RX ORDER — AZITHROMYCIN 250 MG/1
TABLET, FILM COATED ORAL
COMMUNITY
Start: 2023-04-12

## 2023-04-17 RX ORDER — OXYCODONE HYDROCHLORIDE 30 MG/1
TABLET, FILM COATED, EXTENDED RELEASE ORAL
COMMUNITY
Start: 2023-04-14

## 2023-04-17 NOTE — PROGRESS NOTES
"Fanny Perera is a 67 y.o. female in for follow up of Diarrhea of presumed infectious origin    Incontinence of feces, unspecified fecal incontinence type    Pt is S/P interstim placement stage I on 03/02/2023 and stage II on 03/09/2023.  She presents today for a post-op evaluation.   Believes incisions have healed and denies any pain, drainage, bleeding, or swelling.     BM are not solid and are malodorous.   She denies any fevers.   Taking Colestipol and fiber daily.   Tries to take fiber 2-3x daily, but often forgets.   Taking Imodium PRN.     Pt has tried programs 1-4.   Has been on Program 4 for the past 2 weeks.   States symptoms are not as bad, but still having fecal smearing after BMs.     /80 (BP Location: Left arm, Patient Position: Sitting, Cuff Size: Small Adult)   Pulse 82   Temp 97.6 °F (36.4 °C) (Temporal)   Ht 157.5 cm (62\")   Wt 76.7 kg (169 lb)   LMP  (LMP Unknown)   SpO2 96%   Breastfeeding No   BMI 30.91 kg/m²   Body mass index is 30.91 kg/m².      PE:  Physical Exam  Constitutional:       General: She is not in acute distress.     Appearance: She is well-developed.   HENT:      Head: Normocephalic and atraumatic.   Abdominal:      General: There is no distension.      Palpations: Abdomen is soft.   Musculoskeletal:         General: Normal range of motion.   Skin:     Comments: Incision noted bilaterally along skin overlying sacrum. Incision C/D/I without surrounding erythema, edema, or purulent drainage.    Neurological:      Mental Status: She is alert.   Psychiatric:         Thought Content: Thought content normal.         SNS Interrogation 04/17/2023:  - Impedance check okay  - Battery life okay  - Pt on Program 4 at 6.0 V  - Program changed to Program 5 at 3.6 V. Pt feels stimulation comfortably in the bicycle region.     Assessment:   1. Diarrhea of presumed infectious origin    2. Incontinence of feces, unspecified fecal incontinence type       Plan:  - Will check stool " studies to evaluate for infectious source   - Pt advised to adjust stimulation if uncomfortable and to call the office with any questions or concerns  - Pt encouraged to keep log of bowel habits for comparison   - Follow up in 4 weeks for reevaluation

## 2023-05-02 ENCOUNTER — OFFICE VISIT (OUTPATIENT)
Dept: SURGERY | Facility: CLINIC | Age: 68
End: 2023-05-02
Payer: MEDICARE

## 2023-05-02 VITALS
HEIGHT: 62 IN | WEIGHT: 171.9 LBS | OXYGEN SATURATION: 97 % | SYSTOLIC BLOOD PRESSURE: 142 MMHG | DIASTOLIC BLOOD PRESSURE: 98 MMHG | HEART RATE: 80 BPM | TEMPERATURE: 97.6 F | BODY MASS INDEX: 31.63 KG/M2

## 2023-05-02 DIAGNOSIS — R19.7 DIARRHEA OF PRESUMED INFECTIOUS ORIGIN: ICD-10-CM

## 2023-05-02 DIAGNOSIS — R15.9 INCONTINENCE OF FECES, UNSPECIFIED FECAL INCONTINENCE TYPE: Primary | ICD-10-CM

## 2023-05-02 PROBLEM — M51.369 DEGENERATION OF LUMBAR INTERVERTEBRAL DISC: Status: ACTIVE | Noted: 2023-05-02

## 2023-05-02 PROBLEM — T81.9XXA COMPLICATION OF SURGICAL PROCEDURE: Status: ACTIVE | Noted: 2023-05-02

## 2023-05-02 PROBLEM — M51.36 DEGENERATION OF LUMBAR INTERVERTEBRAL DISC: Status: ACTIVE | Noted: 2023-05-02

## 2023-05-02 PROBLEM — M47.9 SPONDYLOSIS: Status: ACTIVE | Noted: 2023-05-02

## 2023-05-02 LAB — C DIFF TOX GENS STL QL NAA+PROBE: NEGATIVE

## 2023-05-02 PROCEDURE — 87427 SHIGA-LIKE TOXIN AG IA: CPT | Performed by: PHYSICIAN ASSISTANT

## 2023-05-02 PROCEDURE — 87045 FECES CULTURE AEROBIC BACT: CPT | Performed by: PHYSICIAN ASSISTANT

## 2023-05-02 PROCEDURE — 87329 GIARDIA AG IA: CPT | Performed by: PHYSICIAN ASSISTANT

## 2023-05-02 PROCEDURE — 87046 STOOL CULTR AEROBIC BACT EA: CPT | Performed by: PHYSICIAN ASSISTANT

## 2023-05-02 PROCEDURE — 87328 CRYPTOSPORIDIUM AG IA: CPT | Performed by: PHYSICIAN ASSISTANT

## 2023-05-02 RX ORDER — ESTRADIOL 2 MG/1
TABLET ORAL EVERY 24 HOURS
COMMUNITY

## 2023-05-02 NOTE — PROGRESS NOTES
"Fanny Perera is a 67 y.o. female in for follow up of Incontinence of feces, unspecified fecal incontinence type    Diarrhea of presumed infectious origin    Pt is S/P interstim placement stage I on 03/02/2023 and stage II on 03/09/2023.  During her last office visit on 04/17/2023, she was changed from Program 4 at 6V to Program 5 at 3.6V.  Pt states that she stopped feeling the sensation shortly after leaving the office that day.   Thought she needed to feel that sensation longer in order for the device to work and increased to 4.6V.   Reports 1 episode of fecal smearing within the past 2 weeks and is pleased with this.     Stool studies were ordered during last office visit. Pt did not bring in a stool sample in the interim, but brings a sample with her today.   States she continues to have loose and are malodorous.     /98 (BP Location: Left arm, Patient Position: Sitting, Cuff Size: Small Adult)   Pulse 80   Temp 97.6 °F (36.4 °C) (Temporal)   Ht 157.5 cm (62\")   Wt 78 kg (171 lb 14.4 oz)   LMP  (LMP Unknown)   SpO2 97%   Breastfeeding No   BMI 31.44 kg/m²   Body mass index is 31.44 kg/m².      PE:  Physical Exam  Constitutional:       General: She is not in acute distress.     Appearance: She is well-developed.   HENT:      Head: Normocephalic and atraumatic.   Abdominal:      General: There is no distension.      Palpations: Abdomen is soft.   Musculoskeletal:         General: Normal range of motion.   Neurological:      Mental Status: She is alert.   Psychiatric:         Thought Content: Thought content normal.         SNS Interrogation 05/02/2023:  - Impedance check okay  - Battery life okay  - Pt on Program 5 at 4.6 V.     Assessment:   1. Incontinence of feces, unspecified fecal incontinence type    2. Diarrhea of presumed infectious origin    - Improved    Plan:   - Will check stool studies.   - Pt remains on Program 5 at 4.6V given good symptomatic control.  - If stool studies are " negative, will plan to follow up in 1 year. Pt instructed to return for any new or worsening symptoms.

## 2023-05-03 LAB
CRYPTOSP AG STL QL IA: NEGATIVE
G LAMBLIA AG STL QL IA: NEGATIVE

## 2023-05-03 RX ORDER — MONTELUKAST SODIUM 4 MG/1
TABLET, CHEWABLE ORAL
Qty: 60 TABLET | Refills: 0 | OUTPATIENT
Start: 2023-05-03

## 2023-05-05 ENCOUNTER — TELEPHONE (OUTPATIENT)
Dept: SURGERY | Facility: CLINIC | Age: 68
End: 2023-05-05
Payer: MEDICARE

## 2023-05-05 NOTE — TELEPHONE ENCOUNTER
Phoned patient and no answer, left voice message to call the office.    HUB ok to inform patient;    Minerva Gonzalez PA-C Rosario, Luz, MA.    Please let Pt know stool studies are negative. Thank you!       ----- Message from Minerva Gonzalez PA-C sent at 5/5/2023  8:46 AM EDT -----  Please let Pt know stool studies are negative. Thank you!  ----- Message -----  From: Lab, Background User  Sent: 5/4/2023   1:13 PM EDT  To: Minerva Gonzalez PA-C

## 2023-05-06 LAB
BACTERIA SPEC CULT: NORMAL
BACTERIA SPEC CULT: NORMAL
CAMPYLOBACTER STL CULT: NORMAL
E COLI SXT STL QL IA: NEGATIVE
SALM + SHIG STL CULT: NORMAL

## 2023-06-19 ENCOUNTER — TELEPHONE (OUTPATIENT)
Dept: SURGERY | Facility: CLINIC | Age: 68
End: 2023-06-19

## 2023-06-19 NOTE — TELEPHONE ENCOUNTER
Phoned patient and says stimulator not working like before & would like to be seen here at office and have Rep Khan see her as well. Or should I have her come in regarding the leakage first?

## 2023-06-19 NOTE — TELEPHONE ENCOUNTER
Caller: Fanny Perera    Relationship: Self    Best call back number: [phone]     What is the best time to reach you: ANY    Who are you requesting to speak with (clinical staff, provider,  specific staff member): PROVIDER    What was the call regarding: HAVING ISSUES WITH LEAKAGE. WANTS TO KNOW WHAT TO DO IN REGARDS TO PROGRAM ON STIMULATOR    Is it okay if the provider responds through MyChart: NO

## 2023-06-19 NOTE — TELEPHONE ENCOUNTER
Phoned Bill Tatum @ [phone] & LVM to call office to schedule an appt with pt and him at office soon.

## 2023-07-20 ENCOUNTER — TELEPHONE (OUTPATIENT)
Dept: SURGERY | Facility: CLINIC | Age: 68
End: 2023-07-20

## 2023-07-20 NOTE — TELEPHONE ENCOUNTER
It's not working the way it was back then, she had no issues with it. It's been going on since she called & I spoke with her 06/19/2023, stating the same that it's not working like it was at the beginning.

## 2023-07-20 NOTE — TELEPHONE ENCOUNTER
Provider: WILSON  Caller: EDDY CLARKE  Relationship to Patient: SELF  Phone Number: [phone]  PT CAN BE REACHED AT ANYTIME, IF NO ANSWER A DETAILED MSG CAN BE LEFT    Reason for Call: PATIENT IS CONTINUING TO HAVE INCONTINENCE OF BOWELS.

## 2023-07-20 NOTE — TELEPHONE ENCOUNTER
Phoned patient and states that she is still having the same issues with the stimulator not working like it use to. Pt [facility] has talked to both Ghanshyam & Bill, Ghanshyam had told her to go back to program #1 and she said that did  not work so she just went up to program #5 in which she is currently on. [facility] has tried calling both Ghanshyam/Bill & has left messages but no one has gotten back to her. She does have an up coming appointment for the end of July.

## 2023-07-20 NOTE — TELEPHONE ENCOUNTER
Metamucil 1 in AM & 2 at bedtime. One cup of cafe in the AM only, sprite (not sure or how many cans a day), & no artificial sweeteners. She said she has been doing everything the same for months now.

## 2023-10-10 RX ORDER — OMEPRAZOLE 40 MG/1
CAPSULE, DELAYED RELEASE ORAL
Qty: 30 CAPSULE | Refills: 0 | Status: SHIPPED | OUTPATIENT
Start: 2023-10-10

## 2023-11-08 ENCOUNTER — TELEMEDICINE (OUTPATIENT)
Dept: SURGERY | Facility: CLINIC | Age: 68
End: 2023-11-08
Payer: MEDICARE

## 2023-11-08 DIAGNOSIS — R15.9 INCONTINENCE OF FECES, UNSPECIFIED FECAL INCONTINENCE TYPE: Primary | ICD-10-CM

## 2023-11-08 PROCEDURE — 99214 OFFICE O/P EST MOD 30 MIN: CPT | Performed by: PHYSICIAN ASSISTANT

## 2023-11-08 PROCEDURE — 1159F MED LIST DOCD IN RCRD: CPT | Performed by: PHYSICIAN ASSISTANT

## 2023-11-08 PROCEDURE — 1160F RVW MEDS BY RX/DR IN RCRD: CPT | Performed by: PHYSICIAN ASSISTANT

## 2023-11-08 NOTE — PROGRESS NOTES
"Colorectal surgery telemedicine visit    Fanny Perera is a 68 y.o. female for follow up of fecal incontinence.    The patient is quite frustrated because she continues to have issues with fecal incontinence.  She feels that 1 week she will be fine and do well with very few episodes of incontinence, but another week she will do significantly worse.  She does not understand why 1 week will be good and 1 week will be so bad.  She states that she wishes that she never had the sacral neurostimulator implanted because \"it has not helped.\"    She states that she does not see a correlation between any particular foods or beverages and her symptoms.  She states that she has tried cutting out coffee, which did not help she is convinced that the Sprite that she drinks is not the problem.  She states that she never eats artificial sweeteners and hardly ever eats sweets.  She is taking Imodium 8 mg daily and Metamucil 2 teaspoons twice daily.  She has done pelvic floor physical therapy with biofeedback in the past, though it has been a couple of years since she has done that.  She denies any falls or trauma to the area of the implant.    When questioned further, she states that she sees a small improvement in her frequency of incontinence episodes now as compared to prior to the device implantation.  She states that she has tried to contact the [employer] representatives and our office, and \"no one ever called her back.\"    She states that she does water therapy every day, but some days she is not able to participate due to her incontinence episodes.  She states that she has tried each of programs 1 through 5 and she does not have any additional programs on her device to try at this time.  She feels that the device was more effective immediately after implantation than it is currently.    She states that she feels that she \"should not have to\" take Imodium and fiber and watch what she eats or do pelvic floor physical therapy " since she has the stimulator in place.    I reviewed the colonoscopy report by Dr. Ramsay performed on 1/22/2019, noting internal hemorrhoids, normal colonic mucosa, and decreased sphincter tone.  I reviewed the associated pathology report, noting benign random colonic biopsies.    Physical Exam  No acute distress.  Mildly agitated and frustrated due to symptoms.    Assessment:   1. Incontinence of feces, unspecified fecal incontinence type    Status post sacral nerve stimulator stage II placement on 3/9/2023    Plan:  I had a detailed and extensive discussion with the patient regarding possible causes of fecal incontinence.  We discussed avoiding triggering oral intake, including but not limited to artificial sweeteners, caffeine, dairy.  I discussed with her that lumbar spine neuro pathology can also contribute to fecal incontinence.  She states that her neurosurgery providers are aware of her symptoms.  Continue fiber supplementation  I advised her that she can increase her Imodium intake to a maximum of 16 mg/day  I offered to order an x-ray to examine the lead integrity and lead placement.  The patient was somewhat hesitant to proceed with this due to cost, but ultimately was agreeable for me to place the order.  I also recommend an in office evaluation of the stimulator, in addition to a discussion with the [employer] representatives.  I have contacted them for further planning of this conversation.  I discussed additional pelvic floor physical therapy and biofeedback as another treatment option.  The patient would like to follow-up with Dr. Diaz in the office.  We will have the [employer] rep in attendance at this time.  The patient requests an appointment no earlier than 1 or 2 PM.  We could consider a repeat colonoscopy if symptoms persist without an obvious cause    Time Spent: I spent 65 minutes caring for Fanny on this date of service. This time includes time spent by me in the following activities:  preparing for the visit, performing a medically appropriate examination and/or evaluation, counseling and educating the patient/family/caregiver, ordering medications, tests, or procedures, referring and communicating with other health care professionals, documenting information in the medical record, and care coordination.     Jammie Molina PA-C  Physician Assistant  Colorectal Surgery

## 2024-04-11 RX ORDER — OMEPRAZOLE 40 MG/1
CAPSULE, DELAYED RELEASE ORAL
Qty: 90 CAPSULE | Refills: 0 | OUTPATIENT
Start: 2024-04-11

## 2024-04-17 RX ORDER — OMEPRAZOLE 40 MG/1
CAPSULE, DELAYED RELEASE ORAL
Qty: 90 CAPSULE | Refills: 0 | OUTPATIENT
Start: 2024-04-17

## 2024-08-02 ENCOUNTER — OFFICE VISIT (OUTPATIENT)
Dept: GASTROENTEROLOGY | Facility: CLINIC | Age: 69
End: 2024-08-02
Payer: MEDICARE

## 2024-08-02 VITALS
WEIGHT: 144.2 LBS | DIASTOLIC BLOOD PRESSURE: 70 MMHG | HEART RATE: 76 BPM | SYSTOLIC BLOOD PRESSURE: 102 MMHG | TEMPERATURE: 97.5 F | HEIGHT: 62 IN | BODY MASS INDEX: 26.54 KG/M2

## 2024-08-02 DIAGNOSIS — R19.7 DIARRHEA, UNSPECIFIED TYPE: Primary | ICD-10-CM

## 2024-08-02 DIAGNOSIS — R15.9 INCONTINENCE OF FECES, UNSPECIFIED FECAL INCONTINENCE TYPE: ICD-10-CM

## 2024-08-02 RX ORDER — ROPINIROLE 0.25 MG/1
TABLET, FILM COATED ORAL
COMMUNITY
Start: 2024-04-14

## 2024-08-02 RX ORDER — ABATACEPT 125 MG/ML
INJECTION, SOLUTION SUBCUTANEOUS
COMMUNITY
Start: 2024-07-22

## 2024-08-02 RX ORDER — CYANOCOBALAMIN 1000 UG/ML
INJECTION, SOLUTION INTRAMUSCULAR; SUBCUTANEOUS
COMMUNITY
Start: 2024-07-24

## 2024-08-02 RX ORDER — ATORVASTATIN CALCIUM 10 MG/1
TABLET, FILM COATED ORAL
COMMUNITY
Start: 2024-07-08

## 2024-08-02 RX ORDER — LEFLUNOMIDE 20 MG/1
TABLET ORAL
COMMUNITY
Start: 2024-06-14

## 2024-08-02 RX ORDER — HYDROXYZINE HYDROCHLORIDE 25 MG/1
25 TABLET, FILM COATED ORAL
COMMUNITY
Start: 2024-06-10

## 2024-08-02 RX ORDER — DAPAGLIFLOZIN 5 MG/1
TABLET, FILM COATED ORAL
COMMUNITY
Start: 2024-07-12

## 2024-08-02 RX ORDER — GABAPENTIN 100 MG/1
CAPSULE ORAL
COMMUNITY
Start: 2024-07-30

## 2024-08-02 RX ORDER — LOSARTAN POTASSIUM 50 MG/1
TABLET ORAL
COMMUNITY
Start: 2024-07-12

## 2024-08-02 RX ORDER — TIRZEPATIDE 2.5 MG/.5ML
INJECTION, SOLUTION SUBCUTANEOUS
COMMUNITY
Start: 2024-04-16

## 2024-08-02 NOTE — Clinical Note
Could you please get in touch with patient's cardiologist, Dr. Mcbride and request most recent progress note?  I do not see anything in the chart or Care Everywhere.  Thank you

## 2024-08-02 NOTE — Clinical Note
This is a 69-year-old female with history of IBS mixed who has been having predominantly diarrhea for about 2 years now.  She has followed with Dr. Diaz for this in the past but did not feel like she was getting any better so she has not seen anybody for a year until today.  She does have history of implantation of sacral neurostimulator.  She is having multiple episodes of diarrhea and incontinence daily-I ordered a fecal calprotectin and pancreatic elastase as she often has postprandial diarrhea.  She has had several spine surgeries so I suspect that some component of nerve issues are at play with her diarrhea.  She has tried colestipol, cholestyramine and is currently taking Imodium 8 pills daily.  Do think Lomotil would be helpful?  I discussed possible colonoscopy with her depending on stool studies as she has lost about 30 pounds over the last 6 months to a year-she just had extensive cardiac workup reportedly which I cannot view so I did request those records before placing orders.

## 2024-08-02 NOTE — PROGRESS NOTES
Chief Complaint  Fecal Incontinence    Subjective          History of Present Illness    Fanny Perera is a  69 y.o. female presents for follow-up.  She is a patient of Dr. Ramsay and is new to me.  She has a history of GERD, early satiety, IBS with both constipation and diarrhea, pelvic floor dysfunction, status post implantation of sacral neurostimulator.  She was last seen in the office in 2022.    She does have history of fecal incontinence and has follow-up with colorectal surgery.  She last saw them 11/8/2023.  At that time she was still having issues with fecal seepage.  She cannot identify a correlation between particular foods or beverages.  She felt like her symptoms should have been better after having sacral neurostimulator placed.  It was planned for her to follow-up with Dr. Diaz and [employer] device rep in January and again in February but both of these appointments were same-day cancellations by the patient.    Today patient states that she has continued to struggle with fecal incontinence and diarrhea for nearly 2 years now.  She is understandably very upset by this and says she is just so tired of not feeling well.  She has multiple loose bowel movements daily and says that while she does still have some feeling of urgency she will often have bowel movements and not realize it.  She said that some of the symptoms were helped by her stimulator but that at this time she is having bowel movements so frequently that she does not feel like the stimulator is helping at all.  She does have pelvic floor dysfunction and did follow-up with pelvic floor therapy for quite some time to no effect.  She has tried multiple medications including Imodium 8 times daily, Colestid, cholestyramine in the past.  Currently she takes Imodium 8 pills daily but has not noticed much of a difference.  She reports a 30 pound weight loss in the last 6 months.  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  I unfortunately cannot view these records.    1/22/2019 colonoscopy showed internal hemorrhoids, normal mucosa in entire examined colon, decreased sphincter tone on digital rectal exam.  Recommended repeat colonoscopy 10 years for screening purposes.    Dr. Mcbride Cardiologist     Objective   Vital Signs:   /70   Pulse 76   Temp 97.5 °F (36.4 °C)   Ht 157.5 cm (62\")   Wt 65.4 kg (144 lb 3.2 oz)   BMI 26.37 kg/m²       Physical Exam  Constitutional:       General: She is not in acute distress.     Appearance: Normal appearance.   Eyes:      General: No scleral icterus.  Cardiovascular:      Rate and Rhythm: Normal rate.   Pulmonary:      Effort: Pulmonary effort is normal.   Abdominal:      General: Abdomen is flat. Bowel sounds are normal. There is no distension.      Tenderness: There is no abdominal tenderness. There is no guarding.   Skin:     Coloration: Skin is not jaundiced.   Neurological:      General: No focal deficit present.      Mental Status: She is alert and oriented to person, place, and time.   Psychiatric:         Mood and Affect: Mood normal.         Behavior: Behavior normal.          Result Review :   The following data was reviewed by: Lilly Yap PA-C on 08/02/2024:              Assessment:   Diagnoses and all orders for this visit:    1. Diarrhea, unspecified type (Primary)  -     Pancreatic Elastase, Fecal - Stool, Per Rectum  -     Calprotectin, Fecal - Stool, Per Rectum    2. Incontinence of feces, unspecified fecal incontinence type  -     Pancreatic Elastase, Fecal - Stool, Per Rectum  -     Calprotectin, Fecal - Stool, Per Rectum          Plan:   -Will check fecal calprotectin and pancreatic elastase given ongoing diarrhea.  She has had multiple spine surgeries and this could be contributing to her incontinence, but want to rule out other causes of diarrhea that " may be compounding onto this.  It has been about 5 years since her last colonoscopy, and with weight loss and continued diarrhea she would likely benefit from repeat scope.  She says that she just had a large cardiac workup and unfortunately cannot do any of these results I will request these prior to going forward with procedure.  It would also be helpful to have stool studies back as well      Follow Up   No follow-ups on file.    Dragon dictation used throughout this note.         Lilly Yap PA-C  [facility]  [address] [place] [address]  Office: [phone]

## 2024-08-05 ENCOUNTER — TELEPHONE (OUTPATIENT)
Dept: GASTROENTEROLOGY | Facility: CLINIC | Age: 69
End: 2024-08-05
Payer: MEDICARE

## 2024-08-05 NOTE — TELEPHONE ENCOUNTER
Called pt and pt reports that she sees Dr Juan Calderon in [facility].     Called Dr Calderon's office number at [phone] and left vm requesting last office note be faxed to us at [phone].

## 2024-08-05 NOTE — TELEPHONE ENCOUNTER
----- Message from Lilly Yap sent at 8/2/2024  4:22 PM EDT -----  Could you please get in touch with patient's cardiologist, Dr. Mcbride and request most recent progress note?  I do not see anything in the chart or Care Everywhere.  Thank you

## 2024-08-09 DIAGNOSIS — R19.7 DIARRHEA, UNSPECIFIED TYPE: Primary | ICD-10-CM

## 2024-08-09 RX ORDER — DIPHENOXYLATE HYDROCHLORIDE AND ATROPINE SULFATE 2.5; .025 MG/1; MG/1
1 TABLET ORAL 4 TIMES DAILY PRN
Qty: 90 TABLET | Refills: 1 | Status: SHIPPED | OUTPATIENT
Start: 2024-08-09

## 2024-08-20 ENCOUNTER — TELEPHONE (OUTPATIENT)
Dept: GASTROENTEROLOGY | Facility: CLINIC | Age: 69
End: 2024-08-20
Payer: MEDICARE

## 2024-08-20 NOTE — TELEPHONE ENCOUNTER
----- Message from Lilly Yap sent at 8/19/2024  3:52 PM EDT -----  Fecal calprotectin within normal limits

## 2024-08-23 ENCOUNTER — TELEPHONE (OUTPATIENT)
Dept: GASTROENTEROLOGY | Facility: CLINIC | Age: 69
End: 2024-08-23

## 2024-11-21 DIAGNOSIS — R19.7 DIARRHEA, UNSPECIFIED TYPE: ICD-10-CM

## 2024-11-21 RX ORDER — DIPHENOXYLATE HYDROCHLORIDE AND ATROPINE SULFATE 2.5; .025 MG/1; MG/1
TABLET ORAL
Qty: 90 TABLET | Refills: 0 | Status: SHIPPED | OUTPATIENT
Start: 2024-11-21

## (undated) DEVICE — INTENDED FOR TISSUE SEPARATION, AND OTHER PROCEDURES THAT REQUIRE A SHARP SURGICAL BLADE TO PUNCTURE OR CUT.: Brand: BARD-PARKER ® CARBON RIB-BACK BLADES

## (undated) DEVICE — LOU MINOR PROCEDURE: Brand: MEDLINE INDUSTRIES, INC.

## (undated) DEVICE — DRAPE,REIN 53X77,STERILE: Brand: MEDLINE

## (undated) DEVICE — KT PT/PROG SMRT INTERSTIM/X NEUROSTM W/COMMUNIC

## (undated) DEVICE — GOWN SURG AERO CHROME XL

## (undated) DEVICE — CANNULA,ADULT,SOFT-TOUCH,7'TUBE,UC: Brand: PENDING

## (undated) DEVICE — TUBING, SUCTION, 1/4" X 10', STRAIGHT: Brand: MEDLINE

## (undated) DEVICE — APPL CHLORAPREP HI/LITE 26ML ORNG

## (undated) DEVICE — SUT SILK 2/0 SH 30IN K833H

## (undated) DEVICE — SPNG GZ WOVN 4X4IN 12PLY 10/BX STRL

## (undated) DEVICE — DRSNG SURESITE WNDW 4X4.5

## (undated) DEVICE — 3M™ IOBAN™ 2 ANTIMICROBIAL INCISE DRAPE 6648EZ: Brand: IOBAN™ 2

## (undated) DEVICE — STRIP,CLOSURE,WOUND,MEDI-STRIP,1/2X4: Brand: MEDLINE

## (undated) DEVICE — SMOKE EVACUATION TUBING WITH 8 IN INTEGRAL WAND AND SPONGE GUARD: Brand: BUFFALO FILTER

## (undated) DEVICE — 3M™ STERI-STRIP™ COMPOUND BENZOIN TINCTURE 40 BAGS/CARTON 4 CARTONS/CASE C1544: Brand: 3M™ STERI-STRIP™

## (undated) DEVICE — GOWN,SIRUS,NON REINFRCD,LARGE,SET IN SL: Brand: MEDLINE

## (undated) DEVICE — PENCL ES MEGADINE EZ/CLEAN BUTN W/HOLSTR 10FT

## (undated) DEVICE — DRP C/ARM 41X74IN

## (undated) DEVICE — DEV NEUROSTIM EXT VERIFY SACRAL 353101

## (undated) DEVICE — 3M™ IOBAN™ 2 ANTIMICROBIAL INCISE DRAPE 6640EZ: Brand: IOBAN™ 2

## (undated) DEVICE — PATIENT RETURN ELECTRODE, SINGLE-USE, CONTACT QUALITY MONITORING, ADULT, WITH 9FT CORD, FOR PATIENTS WEIGING OVER 33LBS. (15KG): Brand: MEGADYNE

## (undated) DEVICE — ANTIBACTERIAL UNDYED BRAIDED (POLYGLACTIN 910), SYNTHETIC ABSORBABLE SUTURE: Brand: COATED VICRYL

## (undated) DEVICE — NDL HYPO PRECISIONGLIDE REG 25G 1 1/2

## (undated) DEVICE — CVR PROB 96IN LF STRL

## (undated) DEVICE — SUT MNCRYL PLS ANTIB UD 4/0 PS2 18IN

## (undated) DEVICE — STERILE LATEX POWDER-FREE SURGICAL GLOVESWITH NITRILE COATING: Brand: PROTEXIS

## (undated) DEVICE — DRSNG SURESITE123 6X8IN

## (undated) DEVICE — DRSNG SURESITE123 8X12IN

## (undated) DEVICE — GLV SURG SENSICARE PI LF PF 7.5 GRN STRL

## (undated) DEVICE — PREP SOL POVIDONE/IODINE BT 4OZ

## (undated) DEVICE — SPK PIN NSR FLUID NONVNT 2WY MINI LL LF

## (undated) DEVICE — CABL SACRAL NEUROSTM INTERSTIM 218CM

## (undated) DEVICE — SINGLE-USE BIOPSY FORCEPS: Brand: RADIAL JAW 4

## (undated) DEVICE — DRAPE,UTILITY,TAPE,15X26,STERILE: Brand: MEDLINE

## (undated) DEVICE — TRAP FLD MINIVAC MEGADYNE 100ML

## (undated) DEVICE — Device: Brand: DEFENDO AIR/WATER/SUCTION AND BIOPSY VALVE

## (undated) DEVICE — THE TORRENT IRRIGATION SCOPE CONNECTOR IS USED WITH THE TORRENT IRRIGATION TUBING TO PROVIDE IRRIGATION FLUIDS SUCH AS STERILE WATER DURING GASTROINTESTINAL ENDOSCOPIC PROCEDURES WHEN USED IN CONJUNCTION WITH AN IRRIGATION PUMP (OR ELECTROSURGICAL UNIT).: Brand: TORRENT